# Patient Record
Sex: MALE | Race: WHITE | NOT HISPANIC OR LATINO
[De-identification: names, ages, dates, MRNs, and addresses within clinical notes are randomized per-mention and may not be internally consistent; named-entity substitution may affect disease eponyms.]

---

## 2017-03-17 ENCOUNTER — RECORDS - HEALTHEAST (OUTPATIENT)
Dept: ADMINISTRATIVE | Facility: OTHER | Age: 72
End: 2017-03-17

## 2017-03-29 ENCOUNTER — HOSPITAL ENCOUNTER (OUTPATIENT)
Dept: CARDIOLOGY | Facility: HOSPITAL | Age: 72
Discharge: HOME | End: 2017-03-29
Attending: FAMILY MEDICINE

## 2017-03-29 DIAGNOSIS — M79.89 LEG SWELLING: ICD-10-CM

## 2017-03-29 LAB
AORTIC ROOT: 3.8 CM
ASCENDING AORTA: 3.9 CM
BSA FOR ECHO PROCEDURE: 2.28 M2
CV BLOOD PRESSURE: NORMAL MMHG
CV ECHO HEIGHT: 65 IN
CV ECHO WEIGHT: 250 LBS
DOP CALC LVOT AREA: 3.14 CM2
DOP CALC LVOT DIAMETER: 2 CM
DOP CALC LVOT PEAK VEL: 101 CM/S
DOP CALC LVOT STROKE VOLUME: 67.5 CM3
DOP CALCLVOT PEAK VEL VTI: 21.5 CM
EJECTION FRACTION: 59 % (ref 55–75)
FRACTIONAL SHORTENING: 17.5 % (ref 28–44)
INTERVENTRICULAR SEPTUM IN END DIASTOLE: 1.47 CM (ref 0.6–1)
IVS/PW RATIO: 1
LA AREA 1: 17 CM2
LA AREA 2: 10.9 CM2
LEFT ATRIUM LENGTH: 4.18 CM
LEFT ATRIUM SIZE: 3.8 CM
LEFT ATRIUM VOLUME INDEX: 16.5 ML/M2
LEFT ATRIUM VOLUME: 37.7 CM3
LEFT VENTRICLE CARDIAC INDEX: 2.4 L/MIN/M2
LEFT VENTRICLE CARDIAC OUTPUT: 5.5 L/MIN
LEFT VENTRICLE DIASTOLIC VOLUME INDEX: 32.7 CM3/M2 (ref 34–74)
LEFT VENTRICLE DIASTOLIC VOLUME: 74.6 CM3 (ref 62–150)
LEFT VENTRICLE HEART RATE: 81 BPM
LEFT VENTRICLE MASS INDEX: 123.2 G/M2
LEFT VENTRICLE SYSTOLIC VOLUME INDEX: 13.4 CM3/M2 (ref 11–31)
LEFT VENTRICLE SYSTOLIC VOLUME: 30.5 CM3 (ref 21–61)
LEFT VENTRICULAR INTERNAL DIMENSION IN DIASTOLE: 4.62 CM (ref 4.2–5.8)
LEFT VENTRICULAR INTERNAL DIMENSION IN SYSTOLE: 3.81 CM (ref 2.5–4)
LEFT VENTRICULAR MASS: 280.9 G
LEFT VENTRICULAR OUTFLOW TRACT MEAN GRADIENT: 3 MMHG
LEFT VENTRICULAR OUTFLOW TRACT MEAN VELOCITY: 78.5 CM/S
LEFT VENTRICULAR OUTFLOW TRACT PEAK GRADIENT: 4 MMHG
LEFT VENTRICULAR POSTERIOR WALL IN END DIASTOLE: 1.49 CM (ref 0.6–1)
LV STROKE VOLUME INDEX: 29.6 ML/M2
MITRAL VALVE E/A RATIO: 0.5
MV AVERAGE E/E' RATIO: 9.1 CM/S
MV DECELERATION TIME: 151 MS
MV E'TISSUE VEL-LAT: 6.58 CM/S
MV E'TISSUE VEL-MED: 5.03 CM/S
MV LATERAL E/E' RATIO: 8.1
MV MEDIAL E/E' RATIO: 10.6
MV PEAK A VELOCITY: 106 CM/S
MV PEAK E VELOCITY: 53.1 CM/S
NUC REST DIASTOLIC VOLUME INDEX: 4000 LBS
NUC REST SYSTOLIC VOLUME INDEX: 65 IN
TRICUSPID VALVE ANULAR PLANE SYSTOLIC EXCURSION: 2 CM

## 2017-03-29 ASSESSMENT — MIFFLIN-ST. JEOR: SCORE: 1800.87

## 2018-02-12 ENCOUNTER — AMBULATORY - HEALTHEAST (OUTPATIENT)
Dept: VASCULAR SURGERY | Facility: CLINIC | Age: 73
End: 2018-02-12

## 2018-02-12 DIAGNOSIS — L97.522 ULCER OF LEFT FOOT, WITH FAT LAYER EXPOSED (H): ICD-10-CM

## 2018-02-26 ENCOUNTER — AMBULATORY - HEALTHEAST (OUTPATIENT)
Dept: VASCULAR SURGERY | Facility: CLINIC | Age: 73
End: 2018-02-26

## 2018-02-27 ENCOUNTER — RECORDS - HEALTHEAST (OUTPATIENT)
Dept: ADMINISTRATIVE | Facility: OTHER | Age: 73
End: 2018-02-27

## 2018-02-27 ENCOUNTER — RECORDS - HEALTHEAST (OUTPATIENT)
Dept: VASCULAR ULTRASOUND | Facility: CLINIC | Age: 73
End: 2018-02-27

## 2018-02-27 ENCOUNTER — OFFICE VISIT - HEALTHEAST (OUTPATIENT)
Dept: VASCULAR SURGERY | Facility: CLINIC | Age: 73
End: 2018-02-27

## 2018-02-27 DIAGNOSIS — E11.621 DIABETIC ULCER OF LEFT FOOT ASSOCIATED WITH TYPE 2 DIABETES MELLITUS, LIMITED TO BREAKDOWN OF SKIN, UNSPECIFIED PART OF FOOT (H): ICD-10-CM

## 2018-02-27 DIAGNOSIS — L97.521 DIABETIC ULCER OF LEFT FOOT ASSOCIATED WITH TYPE 2 DIABETES MELLITUS, LIMITED TO BREAKDOWN OF SKIN, UNSPECIFIED PART OF FOOT (H): ICD-10-CM

## 2018-02-27 DIAGNOSIS — I73.9 PERIPHERAL VASCULAR DISEASE, UNSPECIFIED (H): ICD-10-CM

## 2018-02-27 DIAGNOSIS — M25.372 INSTABILITY OF LEFT ANKLE JOINT: ICD-10-CM

## 2018-02-27 DIAGNOSIS — M25.372 OTHER INSTABILITY, LEFT ANKLE: ICD-10-CM

## 2018-02-27 DIAGNOSIS — E11.621 TYPE 2 DIABETES MELLITUS WITH FOOT ULCER (CODE) (H): ICD-10-CM

## 2018-02-27 DIAGNOSIS — I73.9 PAD (PERIPHERAL ARTERY DISEASE) (H): ICD-10-CM

## 2018-02-27 DIAGNOSIS — L97.521 NON-PRESSURE CHRONIC ULCER OF OTHER PART OF LEFT FOOT LIMITED TO BREAKDOWN OF SKIN (H): ICD-10-CM

## 2018-02-27 RX ORDER — VIT C/B6/B5/MAGNESIUM/HERB 173 50-5-6-5MG
CAPSULE ORAL
Status: SHIPPED | COMMUNITY
Start: 2018-02-27

## 2018-02-27 RX ORDER — MULTIVIT WITH IRON,MINERALS
TABLET ORAL
Status: SHIPPED | COMMUNITY
Start: 2018-01-07

## 2018-03-01 ENCOUNTER — RECORDS - HEALTHEAST (OUTPATIENT)
Dept: ADMINISTRATIVE | Facility: OTHER | Age: 73
End: 2018-03-01

## 2018-03-01 ENCOUNTER — COMMUNICATION - HEALTHEAST (OUTPATIENT)
Dept: OTHER | Facility: CLINIC | Age: 73
End: 2018-03-01

## 2018-03-01 ENCOUNTER — COMMUNICATION - HEALTHEAST (OUTPATIENT)
Dept: VASCULAR SURGERY | Facility: CLINIC | Age: 73
End: 2018-03-01

## 2018-03-01 DIAGNOSIS — E11.621 TYPE 2 DIABETES WITH SKIN ULCER OF FOOT (H): ICD-10-CM

## 2018-03-01 DIAGNOSIS — L97.509 TYPE 2 DIABETES WITH SKIN ULCER OF FOOT (H): ICD-10-CM

## 2018-03-01 DIAGNOSIS — I73.9 PAD (PERIPHERAL ARTERY DISEASE) (H): ICD-10-CM

## 2018-03-13 ENCOUNTER — HOME CARE/HOSPICE - HEALTHEAST (OUTPATIENT)
Dept: HOME HEALTH SERVICES | Facility: HOME HEALTH | Age: 73
End: 2018-03-13

## 2018-03-13 ENCOUNTER — COMMUNICATION - HEALTHEAST (OUTPATIENT)
Dept: SURGERY | Facility: CLINIC | Age: 73
End: 2018-03-13

## 2018-03-13 ENCOUNTER — OFFICE VISIT - HEALTHEAST (OUTPATIENT)
Dept: VASCULAR SURGERY | Facility: CLINIC | Age: 73
End: 2018-03-13

## 2018-03-13 DIAGNOSIS — L97.529 TYPE 2 DIABETES MELLITUS WITH LEFT DIABETIC FOOT ULCER (H): ICD-10-CM

## 2018-03-13 DIAGNOSIS — I87.2 VENOUS STASIS DERMATITIS OF BOTH LOWER EXTREMITIES: ICD-10-CM

## 2018-03-13 DIAGNOSIS — I89.0 SECONDARY LYMPHEDEMA: ICD-10-CM

## 2018-03-13 DIAGNOSIS — R60.9 DEPENDENT EDEMA: ICD-10-CM

## 2018-03-13 DIAGNOSIS — E11.621 TYPE 2 DIABETES MELLITUS WITH LEFT DIABETIC FOOT ULCER (H): ICD-10-CM

## 2018-03-13 DIAGNOSIS — Z22.39 PSEUDOMONAS AERUGINOSA COLONIZATION: ICD-10-CM

## 2018-03-13 DIAGNOSIS — E66.9 OBESITY: ICD-10-CM

## 2018-03-13 RX ORDER — ACETIC ACID 0.25 G/100ML
IRRIGANT IRRIGATION
Qty: 1,000 ML | Refills: 3 | Status: SHIPPED | OUTPATIENT
Start: 2018-03-13

## 2018-03-13 RX ORDER — GENTAMICIN SULFATE 1 MG/G
OINTMENT TOPICAL
Qty: 30 G | Refills: 3 | Status: SHIPPED | OUTPATIENT
Start: 2018-03-13

## 2018-03-16 ENCOUNTER — COMMUNICATION - HEALTHEAST (OUTPATIENT)
Dept: VASCULAR SURGERY | Facility: CLINIC | Age: 73
End: 2018-03-16

## 2018-03-16 ENCOUNTER — AMBULATORY - HEALTHEAST (OUTPATIENT)
Dept: VASCULAR SURGERY | Facility: CLINIC | Age: 73
End: 2018-03-16

## 2018-03-16 DIAGNOSIS — T14.8XXA WOUND INFECTION: ICD-10-CM

## 2018-03-16 DIAGNOSIS — L08.9 WOUND INFECTION: ICD-10-CM

## 2018-03-16 LAB
BACTERIA SPEC CULT: ABNORMAL
BACTERIA SPEC CULT: ABNORMAL
GRAM STAIN RESULT: ABNORMAL
GRAM STAIN RESULT: ABNORMAL

## 2018-03-16 RX ORDER — MUPIROCIN 20 MG/G
OINTMENT TOPICAL
Qty: 22 G | Refills: 0 | Status: SHIPPED | OUTPATIENT
Start: 2018-03-16

## 2018-03-20 ENCOUNTER — COMMUNICATION - HEALTHEAST (OUTPATIENT)
Dept: VASCULAR SURGERY | Facility: CLINIC | Age: 73
End: 2018-03-20

## 2018-04-03 ENCOUNTER — RECORDS - HEALTHEAST (OUTPATIENT)
Dept: ADMINISTRATIVE | Facility: OTHER | Age: 73
End: 2018-04-03

## 2018-04-03 ENCOUNTER — OFFICE VISIT - HEALTHEAST (OUTPATIENT)
Dept: VASCULAR SURGERY | Facility: CLINIC | Age: 73
End: 2018-04-03

## 2018-04-03 ENCOUNTER — RECORDS - HEALTHEAST (OUTPATIENT)
Dept: VASCULAR ULTRASOUND | Facility: CLINIC | Age: 73
End: 2018-04-03

## 2018-04-03 DIAGNOSIS — I87.2 VENOUS INSUFFICIENCY (CHRONIC) (PERIPHERAL): ICD-10-CM

## 2018-04-03 DIAGNOSIS — R60.9 EDEMA, UNSPECIFIED: ICD-10-CM

## 2018-04-03 DIAGNOSIS — L97.521 DIABETIC ULCER OF LEFT FOOT ASSOCIATED WITH TYPE 2 DIABETES MELLITUS, LIMITED TO BREAKDOWN OF SKIN, UNSPECIFIED PART OF FOOT (H): ICD-10-CM

## 2018-04-03 DIAGNOSIS — Z22.39 CARRIER OF OTHER SPECIFIED BACTERIAL DISEASES: ICD-10-CM

## 2018-04-03 DIAGNOSIS — Z22.39 PSEUDOMONAS AERUGINOSA COLONIZATION: ICD-10-CM

## 2018-04-03 DIAGNOSIS — I89.0 SECONDARY LYMPHEDEMA: ICD-10-CM

## 2018-04-03 DIAGNOSIS — E11.621 TYPE 2 DIABETES MELLITUS WITH LEFT DIABETIC FOOT ULCER (H): ICD-10-CM

## 2018-04-03 DIAGNOSIS — E11.621 TYPE 2 DIABETES MELLITUS WITH FOOT ULCER (CODE) (H): ICD-10-CM

## 2018-04-03 DIAGNOSIS — T14.8XXA WOUND INFECTION: ICD-10-CM

## 2018-04-03 DIAGNOSIS — L97.529 NON-PRESSURE CHRONIC ULCER OF OTHER PART OF LEFT FOOT WITH UNSPECIFIED SEVERITY (H): ICD-10-CM

## 2018-04-03 DIAGNOSIS — L97.529 TYPE 2 DIABETES MELLITUS WITH LEFT DIABETIC FOOT ULCER (H): ICD-10-CM

## 2018-04-03 DIAGNOSIS — L08.9 WOUND INFECTION: ICD-10-CM

## 2018-04-03 DIAGNOSIS — I89.0 LYMPHEDEMA, NOT ELSEWHERE CLASSIFIED: ICD-10-CM

## 2018-04-03 DIAGNOSIS — E66.9 OBESITY: ICD-10-CM

## 2018-04-03 DIAGNOSIS — E11.621 DIABETIC ULCER OF LEFT FOOT ASSOCIATED WITH TYPE 2 DIABETES MELLITUS, LIMITED TO BREAKDOWN OF SKIN, UNSPECIFIED PART OF FOOT (H): ICD-10-CM

## 2018-04-03 DIAGNOSIS — R60.9 DEPENDENT EDEMA: ICD-10-CM

## 2018-04-03 DIAGNOSIS — E66.9 OBESITY, UNSPECIFIED: ICD-10-CM

## 2018-04-03 DIAGNOSIS — I87.2 VENOUS STASIS DERMATITIS OF BOTH LOWER EXTREMITIES: ICD-10-CM

## 2018-04-25 ENCOUNTER — OFFICE VISIT - HEALTHEAST (OUTPATIENT)
Dept: VASCULAR SURGERY | Facility: CLINIC | Age: 73
End: 2018-04-25

## 2018-04-25 DIAGNOSIS — E11.621 DIABETIC ULCER OF LEFT FOOT ASSOCIATED WITH TYPE 2 DIABETES MELLITUS, LIMITED TO BREAKDOWN OF SKIN, UNSPECIFIED PART OF FOOT (H): ICD-10-CM

## 2018-04-25 DIAGNOSIS — R60.9 DEPENDENT EDEMA: ICD-10-CM

## 2018-04-25 DIAGNOSIS — L97.521 DIABETIC ULCER OF LEFT FOOT ASSOCIATED WITH TYPE 2 DIABETES MELLITUS, LIMITED TO BREAKDOWN OF SKIN, UNSPECIFIED PART OF FOOT (H): ICD-10-CM

## 2018-04-25 DIAGNOSIS — I89.0 SECONDARY LYMPHEDEMA: ICD-10-CM

## 2018-04-25 DIAGNOSIS — E11.621 TYPE 2 DIABETES MELLITUS WITH LEFT DIABETIC FOOT ULCER (H): ICD-10-CM

## 2018-04-25 DIAGNOSIS — L97.529 TYPE 2 DIABETES MELLITUS WITH LEFT DIABETIC FOOT ULCER (H): ICD-10-CM

## 2018-04-25 DIAGNOSIS — I87.2 VENOUS STASIS DERMATITIS OF BOTH LOWER EXTREMITIES: ICD-10-CM

## 2018-04-25 DIAGNOSIS — Z22.39 PSEUDOMONAS AERUGINOSA COLONIZATION: ICD-10-CM

## 2018-04-25 DIAGNOSIS — T14.8XXA WOUND INFECTION: ICD-10-CM

## 2018-04-25 DIAGNOSIS — L08.9 WOUND INFECTION: ICD-10-CM

## 2018-05-02 ENCOUNTER — OFFICE VISIT - HEALTHEAST (OUTPATIENT)
Dept: SURGERY | Facility: CLINIC | Age: 73
End: 2018-05-02

## 2018-05-02 DIAGNOSIS — E11.9 DIABETES MELLITUS TYPE II, CONTROLLED (H): ICD-10-CM

## 2018-05-02 DIAGNOSIS — E66.01 OBESITY, CLASS III, BMI 40-49.9 (MORBID OBESITY) (H): ICD-10-CM

## 2018-05-02 DIAGNOSIS — R60.0 BILATERAL LEG EDEMA: ICD-10-CM

## 2018-05-02 DIAGNOSIS — R53.83 FATIGUE: ICD-10-CM

## 2018-05-02 RX ORDER — BUPROPION HYDROCHLORIDE 75 MG/1
TABLET ORAL
Qty: 90 TABLET | Refills: 1 | Status: SHIPPED | OUTPATIENT
Start: 2018-05-02

## 2018-05-02 RX ORDER — NALTREXONE HYDROCHLORIDE 50 MG/1
TABLET, FILM COATED ORAL
Qty: 60 TABLET | Refills: 0 | Status: SHIPPED | OUTPATIENT
Start: 2018-05-02

## 2018-05-02 ASSESSMENT — MIFFLIN-ST. JEOR: SCORE: 1900.66

## 2018-05-03 ENCOUNTER — AMBULATORY - HEALTHEAST (OUTPATIENT)
Dept: SLEEP MEDICINE | Facility: CLINIC | Age: 73
End: 2018-05-03

## 2018-05-15 ENCOUNTER — OFFICE VISIT - HEALTHEAST (OUTPATIENT)
Dept: PHYSICAL THERAPY | Facility: REHABILITATION | Age: 73
End: 2018-05-15

## 2018-05-15 DIAGNOSIS — E66.01 OBESITY, CLASS III, BMI 40-49.9 (MORBID OBESITY) (H): ICD-10-CM

## 2018-05-15 DIAGNOSIS — R60.1 GENERALIZED EDEMA: ICD-10-CM

## 2018-05-15 DIAGNOSIS — E66.01 MORBID OBESITY (H): ICD-10-CM

## 2018-05-15 DIAGNOSIS — I89.0 LYMPHEDEMA: ICD-10-CM

## 2018-05-17 ENCOUNTER — OFFICE VISIT - HEALTHEAST (OUTPATIENT)
Dept: PHYSICAL THERAPY | Facility: REHABILITATION | Age: 73
End: 2018-05-17

## 2018-05-17 DIAGNOSIS — R60.1 GENERALIZED EDEMA: ICD-10-CM

## 2018-05-17 DIAGNOSIS — E66.01 OBESITY, CLASS III, BMI 40-49.9 (MORBID OBESITY) (H): ICD-10-CM

## 2018-05-17 DIAGNOSIS — I89.0 LYMPHEDEMA: ICD-10-CM

## 2018-05-17 DIAGNOSIS — E66.01 MORBID OBESITY (H): ICD-10-CM

## 2018-05-18 ENCOUNTER — OFFICE VISIT - HEALTHEAST (OUTPATIENT)
Dept: SURGERY | Facility: CLINIC | Age: 73
End: 2018-05-18

## 2018-05-18 DIAGNOSIS — Z71.3 DIETARY COUNSELING: ICD-10-CM

## 2018-05-18 DIAGNOSIS — E11.621 DIABETIC ULCER OF LEFT FOOT ASSOCIATED WITH TYPE 2 DIABETES MELLITUS, LIMITED TO BREAKDOWN OF SKIN, UNSPECIFIED PART OF FOOT (H): ICD-10-CM

## 2018-05-18 DIAGNOSIS — L97.521 DIABETIC ULCER OF LEFT FOOT ASSOCIATED WITH TYPE 2 DIABETES MELLITUS, LIMITED TO BREAKDOWN OF SKIN, UNSPECIFIED PART OF FOOT (H): ICD-10-CM

## 2018-05-18 DIAGNOSIS — E66.01 OBESITY, MORBID, BMI 40.0-49.9 (H): ICD-10-CM

## 2018-05-18 ASSESSMENT — MIFFLIN-ST. JEOR: SCORE: 1896.13

## 2018-05-21 ENCOUNTER — OFFICE VISIT - HEALTHEAST (OUTPATIENT)
Dept: PHYSICAL THERAPY | Facility: REHABILITATION | Age: 73
End: 2018-05-21

## 2018-05-21 DIAGNOSIS — E66.01 MORBID OBESITY (H): ICD-10-CM

## 2018-05-21 DIAGNOSIS — R60.1 GENERALIZED EDEMA: ICD-10-CM

## 2018-05-21 DIAGNOSIS — E66.01 OBESITY, CLASS III, BMI 40-49.9 (MORBID OBESITY) (H): ICD-10-CM

## 2018-05-21 DIAGNOSIS — L97.521 DIABETIC ULCER OF LEFT FOOT ASSOCIATED WITH TYPE 2 DIABETES MELLITUS, LIMITED TO BREAKDOWN OF SKIN, UNSPECIFIED PART OF FOOT (H): ICD-10-CM

## 2018-05-21 DIAGNOSIS — I89.0 LYMPHEDEMA: ICD-10-CM

## 2018-05-21 DIAGNOSIS — E11.621 DIABETIC ULCER OF LEFT FOOT ASSOCIATED WITH TYPE 2 DIABETES MELLITUS, LIMITED TO BREAKDOWN OF SKIN, UNSPECIFIED PART OF FOOT (H): ICD-10-CM

## 2018-05-22 ENCOUNTER — RECORDS - HEALTHEAST (OUTPATIENT)
Dept: SLEEP MEDICINE | Facility: CLINIC | Age: 73
End: 2018-05-22

## 2018-05-22 DIAGNOSIS — E66.01 MORBID (SEVERE) OBESITY DUE TO EXCESS CALORIES (H): ICD-10-CM

## 2018-05-22 DIAGNOSIS — E11.9 TYPE 2 DIABETES MELLITUS WITHOUT COMPLICATIONS (H): ICD-10-CM

## 2018-05-23 ENCOUNTER — COMMUNICATION - HEALTHEAST (OUTPATIENT)
Dept: VASCULAR SURGERY | Facility: CLINIC | Age: 73
End: 2018-05-23

## 2018-05-23 ENCOUNTER — OFFICE VISIT - HEALTHEAST (OUTPATIENT)
Dept: VASCULAR SURGERY | Facility: CLINIC | Age: 73
End: 2018-05-23

## 2018-05-23 ENCOUNTER — OFFICE VISIT - HEALTHEAST (OUTPATIENT)
Dept: PHYSICAL THERAPY | Facility: REHABILITATION | Age: 73
End: 2018-05-23

## 2018-05-23 DIAGNOSIS — L97.529 TYPE 2 DIABETES MELLITUS WITH LEFT DIABETIC FOOT ULCER (H): ICD-10-CM

## 2018-05-23 DIAGNOSIS — L08.9 WOUND INFECTION: ICD-10-CM

## 2018-05-23 DIAGNOSIS — E11.621 TYPE 2 DIABETES MELLITUS WITH LEFT DIABETIC FOOT ULCER (H): ICD-10-CM

## 2018-05-23 DIAGNOSIS — R60.1 GENERALIZED EDEMA: ICD-10-CM

## 2018-05-23 DIAGNOSIS — T14.8XXA WOUND INFECTION: ICD-10-CM

## 2018-05-23 DIAGNOSIS — I89.0 SECONDARY LYMPHEDEMA: ICD-10-CM

## 2018-05-23 DIAGNOSIS — I89.0 LYMPHEDEMA: ICD-10-CM

## 2018-05-23 DIAGNOSIS — E66.01 MORBID OBESITY (H): ICD-10-CM

## 2018-05-23 DIAGNOSIS — I87.2 VENOUS STASIS DERMATITIS OF BOTH LOWER EXTREMITIES: ICD-10-CM

## 2018-05-23 DIAGNOSIS — Z22.39 PSEUDOMONAS AERUGINOSA COLONIZATION: ICD-10-CM

## 2018-05-23 DIAGNOSIS — E66.01 OBESITY, CLASS III, BMI 40-49.9 (MORBID OBESITY) (H): ICD-10-CM

## 2018-05-23 DIAGNOSIS — R60.9 DEPENDENT EDEMA: ICD-10-CM

## 2018-05-23 RX ORDER — TRIAMCINOLONE ACETONIDE 5 MG/G
OINTMENT TOPICAL
Qty: 60 G | Refills: 0 | Status: SHIPPED | OUTPATIENT
Start: 2018-05-23

## 2018-05-24 ENCOUNTER — OFFICE VISIT - HEALTHEAST (OUTPATIENT)
Dept: PHYSICAL THERAPY | Facility: REHABILITATION | Age: 73
End: 2018-05-24

## 2018-05-24 DIAGNOSIS — R60.1 GENERALIZED EDEMA: ICD-10-CM

## 2018-05-24 DIAGNOSIS — E66.01 MORBID OBESITY (H): ICD-10-CM

## 2018-05-24 DIAGNOSIS — E66.01 OBESITY, CLASS III, BMI 40-49.9 (MORBID OBESITY) (H): ICD-10-CM

## 2018-05-29 ENCOUNTER — OFFICE VISIT - HEALTHEAST (OUTPATIENT)
Dept: PHYSICAL THERAPY | Facility: REHABILITATION | Age: 73
End: 2018-05-29

## 2018-05-29 DIAGNOSIS — E66.01 OBESITY, CLASS III, BMI 40-49.9 (MORBID OBESITY) (H): ICD-10-CM

## 2018-05-29 DIAGNOSIS — R60.1 GENERALIZED EDEMA: ICD-10-CM

## 2018-05-29 DIAGNOSIS — E66.01 MORBID OBESITY (H): ICD-10-CM

## 2018-05-29 DIAGNOSIS — I89.0 LYMPHEDEMA: ICD-10-CM

## 2018-05-29 DIAGNOSIS — M62.81 GENERALIZED MUSCLE WEAKNESS: ICD-10-CM

## 2018-06-04 ENCOUNTER — OFFICE VISIT - HEALTHEAST (OUTPATIENT)
Dept: PHYSICAL THERAPY | Facility: REHABILITATION | Age: 73
End: 2018-06-04

## 2018-06-04 DIAGNOSIS — R60.1 GENERALIZED EDEMA: ICD-10-CM

## 2018-06-04 DIAGNOSIS — E66.01 OBESITY, CLASS III, BMI 40-49.9 (MORBID OBESITY) (H): ICD-10-CM

## 2018-06-04 DIAGNOSIS — E66.01 MORBID OBESITY (H): ICD-10-CM

## 2018-06-04 DIAGNOSIS — M62.81 GENERALIZED MUSCLE WEAKNESS: ICD-10-CM

## 2018-06-08 ENCOUNTER — OFFICE VISIT - HEALTHEAST (OUTPATIENT)
Dept: PHYSICAL THERAPY | Facility: REHABILITATION | Age: 73
End: 2018-06-08

## 2018-06-08 DIAGNOSIS — M62.81 GENERALIZED MUSCLE WEAKNESS: ICD-10-CM

## 2018-06-08 DIAGNOSIS — R60.1 GENERALIZED EDEMA: ICD-10-CM

## 2018-06-08 DIAGNOSIS — E11.621 DIABETIC ULCER OF LEFT FOOT ASSOCIATED WITH TYPE 2 DIABETES MELLITUS, LIMITED TO BREAKDOWN OF SKIN, UNSPECIFIED PART OF FOOT (H): ICD-10-CM

## 2018-06-08 DIAGNOSIS — L97.521 DIABETIC ULCER OF LEFT FOOT ASSOCIATED WITH TYPE 2 DIABETES MELLITUS, LIMITED TO BREAKDOWN OF SKIN, UNSPECIFIED PART OF FOOT (H): ICD-10-CM

## 2018-06-08 DIAGNOSIS — I89.0 LYMPHEDEMA: ICD-10-CM

## 2018-06-08 DIAGNOSIS — E66.01 OBESITY, CLASS III, BMI 40-49.9 (MORBID OBESITY) (H): ICD-10-CM

## 2018-06-08 DIAGNOSIS — E66.01 MORBID OBESITY (H): ICD-10-CM

## 2018-06-11 ENCOUNTER — COMMUNICATION - HEALTHEAST (OUTPATIENT)
Dept: PHYSICAL THERAPY | Facility: REHABILITATION | Age: 73
End: 2018-06-11

## 2018-06-27 ENCOUNTER — OFFICE VISIT - HEALTHEAST (OUTPATIENT)
Dept: VASCULAR SURGERY | Facility: CLINIC | Age: 73
End: 2018-06-27

## 2018-06-27 DIAGNOSIS — R60.9 DEPENDENT EDEMA: ICD-10-CM

## 2018-06-27 DIAGNOSIS — I89.0 SECONDARY LYMPHEDEMA: ICD-10-CM

## 2018-06-27 DIAGNOSIS — T14.8XXA WOUND INFECTION: ICD-10-CM

## 2018-06-27 DIAGNOSIS — L08.9 WOUND INFECTION: ICD-10-CM

## 2018-06-27 DIAGNOSIS — I87.2 VENOUS STASIS DERMATITIS OF BOTH LOWER EXTREMITIES: ICD-10-CM

## 2018-06-27 DIAGNOSIS — Z22.39 PSEUDOMONAS AERUGINOSA COLONIZATION: ICD-10-CM

## 2018-06-27 ASSESSMENT — MIFFLIN-ST. JEOR: SCORE: 1896.13

## 2018-07-25 ENCOUNTER — OFFICE VISIT - HEALTHEAST (OUTPATIENT)
Dept: VASCULAR SURGERY | Facility: CLINIC | Age: 73
End: 2018-07-25

## 2018-07-25 DIAGNOSIS — I89.0 SECONDARY LYMPHEDEMA: ICD-10-CM

## 2018-07-25 DIAGNOSIS — L03.116 CELLULITIS OF LEFT LEG: ICD-10-CM

## 2018-07-25 DIAGNOSIS — R60.9 DEPENDENT EDEMA: ICD-10-CM

## 2018-07-25 DIAGNOSIS — E11.621 TYPE 2 DIABETES MELLITUS WITH LEFT DIABETIC FOOT ULCER (H): ICD-10-CM

## 2018-07-25 DIAGNOSIS — L97.529 TYPE 2 DIABETES MELLITUS WITH LEFT DIABETIC FOOT ULCER (H): ICD-10-CM

## 2018-07-25 DIAGNOSIS — S90.424A BLISTER OF TOE OF RIGHT FOOT WITHOUT INFECTION, INITIAL ENCOUNTER: ICD-10-CM

## 2018-07-25 DIAGNOSIS — I87.2 VENOUS STASIS DERMATITIS OF BOTH LOWER EXTREMITIES: ICD-10-CM

## 2018-08-22 ENCOUNTER — OFFICE VISIT - HEALTHEAST (OUTPATIENT)
Dept: VASCULAR SURGERY | Facility: CLINIC | Age: 73
End: 2018-08-22

## 2018-08-22 DIAGNOSIS — R60.9 DEPENDENT EDEMA: ICD-10-CM

## 2018-08-22 DIAGNOSIS — L97.529 TYPE 2 DIABETES MELLITUS WITH LEFT DIABETIC FOOT ULCER (H): ICD-10-CM

## 2018-08-22 DIAGNOSIS — I87.2 VENOUS STASIS DERMATITIS OF BOTH LOWER EXTREMITIES: ICD-10-CM

## 2018-08-22 DIAGNOSIS — I89.0 SECONDARY LYMPHEDEMA: ICD-10-CM

## 2018-08-22 DIAGNOSIS — E11.621 TYPE 2 DIABETES MELLITUS WITH LEFT DIABETIC FOOT ULCER (H): ICD-10-CM

## 2018-08-22 ASSESSMENT — MIFFLIN-ST. JEOR: SCORE: 1906.56

## 2018-12-07 ENCOUNTER — OFFICE VISIT - HEALTHEAST (OUTPATIENT)
Dept: VASCULAR SURGERY | Facility: CLINIC | Age: 73
End: 2018-12-07

## 2018-12-07 DIAGNOSIS — B35.1 ONYCHOMYCOSIS: ICD-10-CM

## 2018-12-07 DIAGNOSIS — R60.9 DEPENDENT EDEMA: ICD-10-CM

## 2018-12-07 DIAGNOSIS — I87.2 VENOUS STASIS DERMATITIS OF BOTH LOWER EXTREMITIES: ICD-10-CM

## 2018-12-07 DIAGNOSIS — E11.621 TYPE 2 DIABETES MELLITUS WITH LEFT DIABETIC FOOT ULCER (H): ICD-10-CM

## 2018-12-07 DIAGNOSIS — I89.0 SECONDARY LYMPHEDEMA: ICD-10-CM

## 2018-12-07 DIAGNOSIS — L97.529 TYPE 2 DIABETES MELLITUS WITH LEFT DIABETIC FOOT ULCER (H): ICD-10-CM

## 2021-05-29 ENCOUNTER — RECORDS - HEALTHEAST (OUTPATIENT)
Dept: ADMINISTRATIVE | Facility: CLINIC | Age: 76
End: 2021-05-29

## 2021-05-30 VITALS — WEIGHT: 250 LBS | BODY MASS INDEX: 41.65 KG/M2 | HEIGHT: 65 IN

## 2021-06-01 VITALS — BODY MASS INDEX: 45.32 KG/M2 | HEIGHT: 65 IN | WEIGHT: 272 LBS

## 2021-06-01 VITALS — WEIGHT: 276 LBS | BODY MASS INDEX: 45.93 KG/M2

## 2021-06-01 VITALS — BODY MASS INDEX: 45.15 KG/M2 | WEIGHT: 271 LBS | HEIGHT: 65 IN

## 2021-06-01 VITALS — HEIGHT: 65 IN | WEIGHT: 271 LBS | BODY MASS INDEX: 45.15 KG/M2

## 2021-06-01 VITALS — HEIGHT: 65 IN | BODY MASS INDEX: 45.54 KG/M2 | WEIGHT: 273.3 LBS

## 2021-06-02 VITALS — BODY MASS INDEX: 43.82 KG/M2 | WEIGHT: 263.3 LBS

## 2021-06-16 PROBLEM — E11.621 DIABETIC ULCER OF LEFT FOOT ASSOCIATED WITH TYPE 2 DIABETES MELLITUS, LIMITED TO BREAKDOWN OF SKIN, UNSPECIFIED PART OF FOOT (H): Status: ACTIVE | Noted: 2018-02-27

## 2021-06-16 PROBLEM — L97.521 DIABETIC ULCER OF LEFT FOOT ASSOCIATED WITH TYPE 2 DIABETES MELLITUS, LIMITED TO BREAKDOWN OF SKIN, UNSPECIFIED PART OF FOOT (H): Status: ACTIVE | Noted: 2018-02-27

## 2021-06-16 NOTE — PROGRESS NOTES
FOOT AND ANKLE SURGERY/PODIATRY Progress Note        ASSESSMENT:   Diabetic Ulceration left foot/ankle  Venous Stasis       TREATMENT:  -The left foot and ankle ulcerations are stable. No signs of infection on exam today. I discussed the principles of wound healing today including the importance of limited walking on the involved limb, good vascular perfusion, good glycemic control and the absence of infection. Referred for CAM boot.   -Base of the ulceration has fibrotic tissue. Recommend use of santyl for enzymatic debridement.   -I suspect the medial left ankle ulcer is related to venous stasis. Referred to Sarika Newsome for consultation.   -I was unable to palpate pedal pulses today. Referred for WILLIAMS's. Preliminary report indicates good perfusion to both feet.  -Sharp, excisional debridement of the wound into subcutaneous tissue was performed using currette for a total of 4.57 square centimeters. Debridement was done to reduce pressure, remove non-viable, devitalized tissue and promote wound healing. Patient tolerated this well. A gauze dressing was applied. He will apply Santyl as directed with a gauze dressing.  -He will stay limited walking in a CAM boot and will follow-up with Sarika Newsome for continued cares if she would like to continue to treat both ulcerations and venous stasis.       Kahlil Borges, [facility]      HPI: Efra Alvarez was seen today at the request of Dr. Franklin for evaluation of sores on the left foot and ankle. According to the patient the both the left foot and ankle sores started about the same time, approximately one month ago after dropping a board on his foot. He is a well controlled diabetic. Admits to smoking tobacco, 1/2 pack per day for 5+ years. Denies prior foot ulceration. He has been applying a gauze dressing with coban. Currently walking in slippers.     Past Medical History:   Diagnosis Date     Depression      Diabetes      History of DVT (deep vein  thrombosis) 2009    left leg     Hypertension      Leg swelling      Osteoarthritis      Skin ulcer of left foot        No Known Allergies      Current Outpatient Prescriptions:      aspirin 81 MG EC tablet, Take 81 mg by mouth daily., Disp: , Rfl:      cholecalciferol, vitamin D3, (VITAMIN D3) 5,000 unit Tab, Take by mouth., Disp: , Rfl:      FOLIC ACID/MULTIVITS-MIN/LUT (CENTRUM SILVER ORAL), Take by mouth., Disp: , Rfl:      KRILL/OM3/DHA/EPA/OM6/LIP/ASTX (KRILL OIL, OMEGA 3 & 6, ORAL), Take by mouth., Disp: , Rfl:      metFORMIN (FORTAMET) 500 MG (OSM) 24 hr tablet, Take 500 mg by mouth daily with breakfast., Disp: , Rfl:      multivitamin with iron-mineral Tab, , Disp: , Rfl:      naproxen (NAPROSYN) 500 MG tablet, Take 500 mg by mouth 2 (two) times a day with meals., Disp: , Rfl:      triamterene-hydrochlorothiazide (MAXZIDE) 75-50 mg per tablet, Take 1 tablet by mouth daily., Disp: , Rfl:      turmeric root extract 500 mg cap, Take by mouth., Disp: , Rfl:      calcium carbonate-vitamin D3 500mg (1,250mg) -600 unit Tab, , Disp: , Rfl:      cephalexin (KEFLEX) 500 MG capsule, , Disp: , Rfl:      co-enzyme Q-10 30 mg capsule, Take 30 mg by mouth 3 (three) times a day., Disp: , Rfl:      pgwyh-qtdkn-wcyrgij-pramoxine 3.5-500-10,000 mg-unit-unit/g Oint, , Disp: , Rfl:     Current Facility-Administered Medications:      lidocaine 2 % jelly (XYLOCAINE), , Topical, PRN, Kahlil Borges, MAYNOR, 2 application at 02/27/18 1003    Past Surgical History:   Procedure Laterality Date     TOTAL HIP ARTHROPLASTY Right 1999     TOTAL KNEE ARTHROPLASTY Left 2005       Social History     Social History Narrative       Family History   Problem Relation Age of Onset     Parkinsonism Mother      Lung cancer Father        Review of Systems - Negative       OBJECTIVE:  Appearance: alert, well appearing, and in no distress.    Vitals:    02/27/18 0951   BP: 128/76   Pulse: 84   Resp: 20   Temp: 97.8  F (36.6  C)       Vascular:  Dorsalis pedis and PT non-palpableLeft. Diffuse edema bilateral lower extremities.   Dermatologic:   Wound 02/27/18 (Active)   Pre Size Length 1.7 2/27/2018  9:00 AM   Pre Size Width 1.1 2/27/2018  9:00 AM   Pre Size Depth 0.4 2/27/2018  9:00 AM   Pre Total Sq cm 1.87 2/27/2018  9:00 AM       Wound 02/27/18 Left Medial Ankle (Active)   Pre Size Length 1.5 2/27/2018  9:00 AM   Pre Size Width 1.8 2/27/2018  9:00 AM   Pre Size Depth 0.2 2/27/2018  9:00 AM   Pre Total Sq cm 2.7 2/27/2018  9:00 AM   Granular and fibrotic base at both ulcerations, does not probe to deep tissues. No erythema noted left foot. Diffuse edema bilateral lower extremities.   Neurologic: Diminished to light touch Left.  Musculoskeletal: Contracted digits noted Left.    Imaging: None    Picture: none

## 2021-06-17 NOTE — PROGRESS NOTES
Follow up Vascular Visit       Date of Service:4/25/2018    Date Last Seen: 3/13/2018; 3/20/2018    Chief Complaint: BLE swelling; left foot and ankle ulcers    History:   Past Medical History:   Diagnosis Date     Deep vein thrombosis      Depression      Diabetes      History of DVT (deep vein thrombosis) 2009    left leg     Hypertension      Leg swelling      Osteoarthritis      Skin ulcer of left foot        Pt returns to the Vascular clinic with regards to their BLE swelling and left foot and ankle ulcers. Arrives today alone. He currently has home care. The current poc consists of 3 days per week washing the legs with acetic acid; applying gentamycin to the open areas; oil emulsion; ABD; rolled gauze; single tubigrips; we had ordered double tubigrips and short stretch but he did not tolerate. He has no compression garments at home; home care was supposed to order these. Denies fevers, chills. FS running 100-115. Did not  the am lactin lotion.     Allergies: Review of patient's allergies indicates no known allergies.    Physical Exam:    /84  Pulse 84  Temp 97.9  F (36.6  C) (Oral)   Resp 20    General:  Patient presents to clinic in no apparent distress.  Head: normocephalic atraumatic  Psychiatric:  Alert and oriented x3.   Respiratory: unlabored breathing; no cough  Integumentary:  Skin is uniformly warm, dry and pink.    Wound #1 Location: left anterior foot  Size: 0.9x0.6 x 0.1depth. This is smaller.  No sinus tract present, Wound base: initially covered with slough; this was debrided to reveal 90% viable wound bed  No undermining present. Periwound: no denudement, erythema, induration, maceration or warmth.    Wound #1 Location: left medial ankle  Size: 0.9x0.9 x 0.1depth. This is smaller; no sinus tract present, Wound base: initially covered with slough; this was debrided to reveal 90% viable wound bed  No undermining present. Periwound: no denudement, erythema, induration, maceration or  warmth.        Circumferential volume measures:    Vasc Edema 3/13/2018 4/25/2018   Right just above MTP 25 -   Right Ankle 26 -   Right Widest Calf 39 -   Right Thigh Up 10cm 52 -   Left - just above MTP 27 25.5   Left Ankle 25 25.5   Left Widest Calf 44 42.7   Left Thigh Up 10cm 54 -       Ulceration(s)/Wound(s):     Wound 02/27/18 (Active)   Pre Size Length 0.9 4/25/2018 10:00 AM   Pre Size Width 0.6 4/25/2018 10:00 AM   Pre Size Depth 0.54 4/25/2018 10:00 AM   Pre Total Sq cm 1.5 4/3/2018 10:00 AM       Wound 02/27/18 Left Medial Ankle (Active)   Pre Size Length 0.9 4/25/2018 10:00 AM   Pre Size Width 0.9 4/25/2018 10:00 AM   Pre Size Depth 0 4/3/2018 10:00 AM   Pre Total Sq cm 0.81 4/25/2018 10:00 AM        Lab Values    No results found for: SEDRATE  No results found for: CREATININE  No results found for: HGBA1C  No results found for: BUN  No results found for: LABPROT, LABALUM, ALBUMIN  No results found for: [id number]      Impression:   Left foot ulcers  Type 2 diabetes with peripheral neuropathy  Venous hypertension  Venous insufficiency  Dependent edema  Pseudomonas infection  Hx of DVT  History of right total hip  History of left total knee     Assessment/Plan:  1. Excisional debridement of all the ulcer(s) was recommended today. After consent was obtained and 2% Lidocaine HCL jelly was applied all of the ulcers were excisionally debrided using a sterile curet under clean condition the epidermal, dermal and down into the subcutaneous tissue  were sharply debrided for a total square cm of 1.35. Devitalized and non viable tissue was removed to improve granulation tissue formation, stimulate wound healing, decrease overall bacteria load, disrupt biofilm formation and decrease edge senescence. Patient tolerated this well and the ulcers appeared much  afterwards.     2. Edema. We spoke at length regarding their swelling, potential causes, and treatment options. Answered all questions. Their swelling  is likely multifactorial including but not limited to the following: their weight, dependency of the limbs for most hours of the day, reduced activity with reduced calf pump mechanism, venous hypertension, valvular incompetence, history of DVT, side effect of certain medications and history of joint replacements. Ready to transition into velcro wraps; we will or home care will order these through [facility]; home care to instruct the patient on how to don/doff; will continue with tubigrips until then. The patient should continue to elevate their legs periodically throughout the day. Continue to take their diuretics per their PMD recommendations. He completed a venous insufficiency study and this was normal; no dvt; no areas of incomptence; results were d/w the pt and answered all questions     3. Treatment continue home care every 2-3 days; continue gentamycin; acetic acid; have them wash the leg and wounds with acetic acid; apply gentamycin ointment to the open weeping areas as well as the ulcers; then medihoney alginate to the wounds; cover with oil emulsion; gauze rolled gauze     4. Offloading: eventually will benefit from diabetic shoes and insoles     5. Nutrition: we spoke about his weight an how this contributes to his swelling; he was accepting of a bariatrics referral     Patient to return to clinic in 2-3 week(s) for re-evaluation. They were instructed to call the clinic sooner with any further questions or concerns. Answered all questions.     Sarika Newsome DNP, RN, CNP, [facility]  [phone]

## 2021-06-17 NOTE — PROGRESS NOTES
[facility]:  Non-Surgical Weight Loss Intake Appointment   Date of visit: 5/2/2018  Physician: Ernie Jaime MD  Primary Care is Sallie Franklin MD.  Efra Alvarez   72 y.o.  male  Efra is a 72 y.o. year old male who is here today for consultation regarding non-surgical weight loss.   he was referred to the [facility] by BENJI Newsome at the Vascular Clinic.    Weight History:   Wt Readings from Last 3 Encounters:   05/02/18 (!) 272 lb (123.4 kg)   02/27/18 (!) 276 lb (125.2 kg)   03/29/17 (!) 250 lb (113.4 kg)     Body mass index is 45.26 kg/(m^2).       Assessment and Plan   Assessment: Efra Alvarez is a 72 y.o.,male presenting for assistance with medical weight loss.  Identified issues contributing to his excess weight include:     Patient has gained about 50 lbs since his residential 6-7 years ago from his assisted job at the [employer].  He's been dealing with some lymphedema issues and related wounds of his legs that have limited his mobility the last couple of years, further causing sedentary habits and weight gain.      His diabetes has been well controlled in the past but he's not sure of recent values. Back in 2012 levels ranged from 8.3% down to 5.8%.    His osteoarthritis further limits his ambulation and he's due to have a second knee replacement in the future, weight loss should improve outcome/mobility.    He is supplementing his statin use w/ CoQ 10 but may be a little low in the amount, discussed aiming for about 300mg/day w/ next bottle.    He had an ECHO last year that showed good EF at 59% w/ some diastolic dysfunction.      Plan:  1.  Behavior Goals: 3 daily meals plan, ideally with a large breakfast, medium lunch        and smaller dinner. Avoidance of sugared-sweetened beverages and processed        carbohydrate snacks.  Work towards pre-planning meals to avoid falling back into old             habits/obesogenic habits.  Daily Food  Tracking and morning weight recommended.  [facility]       [facility] to [facility].    2.  Diet Goals: Recommend a [address] Calorie daily restriction.  Increased protein intake to insure at        least 20-30 grams of protein per meal.      3.  Exercise/Activity Goals: will refer for PT for upper body aerobic activity/conditioning while his leg issues heal.  Long term goal of increasing endurance to allow for at least            200 minutes weekly of moderate exercise to maintain weight loss.      4.  Recommendation regarding weight loss medication:  Trial of bupropion and naltrexone started today. Will follow up in 3-4 weeks to check efficacy/tolerance.  Could be reasonable candidate for Victoza depending on his glycemic control.  Given some mild BP elevation today and age over 72, stimulant appetite suppressants will be avoided.    5.  Referral to Dietician for further education and customization of diet plan.    6.  Stress Reduction: doing well, retired. 50 year marriage.    7.  Intake Labs:  Ordered.      8.   Small airway, thick neck, morbidly obese and high risk for ENRIQUE on STOPBANG questionnaire. Will refer for split night sleep study.    9. Hx of elevated triglycerides/low HDL. On fish oil already, exercise/weight loss should further improve.    10. Lymphedema: followed by Vascular clinic, currently using compression and followed for wound care w/ NP Sarika Newsome.    1. Diabetes mellitus type II, controlled  Amb Referral to Bariatric Dietician    Split Night Sleep Study    Comprehensive Metabolic Panel    Vitamin D, Total (25-Hydroxy)    Thyroid Stimulating Hormone (TSH)    Vitamin B12    Zinc, Serum or Plasma    Glycosylated Hemoglobin A1c    Parathyroid Hormone Intact    HM2(CBC w/o Differential)    naltrexone (DEPADE) 50 mg tablet    buPROPion (WELLBUTRIN) 75 MG tablet    Ambulatory referral to Physical Therapy   2. Fatigue  Thyroid Stimulating Hormone (TSH)   3. Obesity, Class III,  BMI 40-49.9 (morbid obesity)  Amb Referral to Bariatric Dietician    Split Night Sleep Study    Comprehensive Metabolic Panel    Vitamin D, Total (25-Hydroxy)    Thyroid Stimulating Hormone (TSH)    Vitamin B12    Zinc, Serum or Plasma    Glycosylated Hemoglobin A1c    Parathyroid Hormone Intact    HM2(CBC w/o Differential)    naltrexone (DEPADE) 50 mg tablet    buPROPion (WELLBUTRIN) 75 MG tablet    Ambulatory referral to Physical Therapy   4. Bilateral leg edema  Vitamin B1 (Thiamine), Whole Blood (VIT B1 WB)    Ambulatory referral to Physical Therapy          No Follow-up on file.     Weight and Lifestyle History    Sleep history:  Goes to bed after 10pm news, falls asleep easily, gets up just once to use bathroom. No food. Wakes up around 6am on his own (retired 6-7 years). Once awake, visits with wife and has cup of coffee for awhile, then has breakfast around 8 (bowl of cereal w/ milk (Cheerios or Corn Flakes)). May run errands or will do some wood working ( tables/chairs/cabinets).  No snacks usually until lunch between 11-1 (lunch is sandwich or salad, relatively light). In the afternoon, run errands or working in the shop (6 days out of the week working on projects). Very rarely snacks. Earlier supper around 3-3:30 as gets grandson off to work (would have hamburger or brats). TV or projects, 8:30pm may have a bag of chips or small snack.  Walking is limited by wounds currently on his legs/feet. 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   Hours per night: see above   Snoring/apnea/insomnia? Some snoring.  Restful/refreshed? yes  Shift work? retired  CPAP/BiPAP? no  Sleep study previously? no  TV in bedroom? Yes, shuts off automatically around 2:30am.  Sleep aids/pills? no      STOP-BANG score for sleep apnea : 6  For general population   ENRIQUE - Low Risk : Yes to 0 - 2 questions  ENRIQUE - Intermediate Risk : Yes to 3 - 4 questions  ENRIQUE - High Risk : Yes to 5 - 8 questions  or Yes to 2 or more of 4 STOP questions  + male gender  or Yes to 2 or more of 4 STOP questions + BMI > 35kg/m2  or Yes to 2 or more of 4 STOP questions + neck circumference 17 inches / 43cm in male or 16 inches / 41cm in female    Weight History:  In what way is your excess weight affecting your wellness/health? Feels slowed   Heaviest weight: current  Any Previous weight loss, what was the most lost and method: none.  Birth weIight, High School graduation weight: 160 lbs.  Lowest adult weight: 220 lbs around USP.  Maternal health/smoking/weight: na  When did you start gaining weight and what were the circumstances? See above  Is anyone else in your immediate family overweight? Younger sister was. Had surgery.  Finally,  Is there a weight you desire to be, what is your goal weight that would define successful weight loss for you? Enough to feel better.   I would like to lose weight so I can  :  Feel healthier and live longer   .     Barriers to weight loss:  Is anyone else at home/work/family a barrier to your weight loss? no  Work schedule/location? retired  Current stressors include: no  Mobility problems: healing wounds. Feet hurt all the time.    Counseled on health benefits of weight loss, goals for metabolic/diabetic risk reduction, HTN reduction, improved sleep and mobility, cancer reduction, longevity.    Fitness History:  Were you ever an athlete?yes     Which sports? Football in HS.  When was the last time you walked/hiked a mile? 2 years ago, regularly walking 45 minutes at the [facility]  Do you have any limitations for activity?foot pain/wounds.  Do you have access to a gym, pool, club, fitness center, or ? Previous [facility]                Do you have any fitness goals/dreams that could motivate your weight loss?na    On a scale from 0-10,  how willing are you to start some sort of movement/exercise regimen? na    Counseled on physiologic benefits of exercise and for long term weight maintenance, goal working towards 200-300 minutes  weekly.     Food History:  Who buys groceries?  shared  Do you eat at dinner table, is the TV on or off, family present? At the table.  Any soda, how much? None., water or coffee will have one glass of milk daily.  Meals per day? See above  Snacks per day? See above  Breakfast typically is: see above  Lunch typically is: see above  Dinner  is: see above  Weekly Fast Food/dining out: once monthly.  Snacks consist of: see above    Food sensitivities/allergies/intolerances/avoidances: no  Water per day? 60 oz (three, 20oz bottles).    Any binging behavior?no  Any induced vomiting/purging? no  Any history of anorexia/bulimia? no  Any night eating issues? no  Stress induced eating? Yes.    Goal Setting:  Short Term Goals under 250 lbs should be good for his diabetes/overall health.  Long Term Goals  Under 200 lbs.         Patient Profile   Social History     Social History Narrative     Family History   Problem Relation Age of Onset     Parkinsonism Mother      Lung cancer Father           Past Medical History   Patient Active Problem List   Diagnosis     Diabetic ulcer of left foot associated with type 2 diabetes mellitus, limited to breakdown of skin, unspecified part of foot     Review of patient's allergies indicates no known allergies.  Current Outpatient Prescriptions   Medication Sig Note     acetic acid 0.25 % irrigation Wash wounds and surrounding skin daily with the acetic acid solution; do NOT rinse      aspirin 81 MG EC tablet Take 81 mg by mouth daily.      calcium carbonate-vitamin D3 500mg (1,250mg) -600 unit Tab  2/27/2018: Received from: External Pharmacy     cholecalciferol, vitamin D3, (VITAMIN D3) 5,000 unit Tab Take by mouth.      co-enzyme Q-10 30 mg capsule Take 30 mg by mouth 3 (three) times a day.      FOLIC ACID/MULTIVITS-MIN/LUT (CENTRUM SILVER ORAL) Take by mouth.      gentamicin (GARAMYCIN) 0.1 % ointment Apply topically to wound daily      KRILL/OM3/DHA/EPA/OM6/LIP/ASTX (KRILL OIL, OMEGA 3 &  "6, ORAL) Take by mouth.      metFORMIN (FORTAMET) 500 MG (OSM) 24 hr tablet Take 500 mg by mouth daily with breakfast. 5/2/2018: He's taking twice daily metformin, unsure if extended release or immediate.     multivitamin with iron-mineral Tab  2/27/2018: Received from: External Pharmacy     mupirocin (BACTROBAN) 2 % ointment Apply topically to wound every day      naproxen (NAPROSYN) 500 MG tablet Take 500 mg by mouth 2 (two) times a day with meals.      myctd-cewbj-urzmewt-pramoxine 3.5-500-10,000 mg-unit-unit/g Oint  2/27/2018: Received from: External Pharmacy     triamterene-hydrochlorothiazide (MAXZIDE) 75-50 mg per tablet Take 1 tablet by mouth daily.      turmeric root extract 500 mg cap Take by mouth.        Past Surgical History  He has a past surgical history that includes Total hip arthroplasty (Right, 1999) and Total knee arthroplasty (Left, 2005).     Examination   /77 (Patient Site: Right Arm, Patient Position: Sitting, Cuff Size: Adult Regular)  Pulse 88  Ht 5' 5\" (1.651 m)  Wt (!) 272 lb (123.4 kg)  SpO2 93%  BMI 45.26 kg/m2  Height: 5' 5\" (1.651 m) (5/2/2018  1:12 PM)  Initial Weight: 272 lbs (5/2/2018  1:12 PM)  Weight: 272 lb (123.4 kg) (5/2/2018  1:12 PM)  Weight loss from initial: 0 (5/2/2018  1:12 PM)  % Weight loss: 0 % (5/2/2018  1:12 PM)  BMI (Calculated): 45.3 (5/2/2018  1:12 PM)  SpO2: 93 % (5/2/2018  1:12 PM)  Heart Rate (/min): 88 (5/2/2018  1:12 PM)  BP (mmHg): 143/77 (5/2/2018  1:12 PM)  Waist Circumference (In): 54 Inches (5/2/2018  1:12 PM)  Hip Circumference (In): 54 Inches (5/2/2018  1:12 PM)  Neck Circumference (In): 20 Inches (5/2/2018  1:12 PM)  General:  Alert and ambulatory, edentulous on top and most of his lower teeth are missing as well.   HEENT:  No conjunctival pallor, moist mucous Membranes, neck is thick, no acanthosis evident.  Pulmonary:  Normal respiratory effort, no cough, no audible wheezes/crackles.  CV:  Regular rate and Rhythm, no murmurs, pulses 2 " plus.  Abdominal: obese, some mild hepatomegaly palpable. Non tender.  Extremities: compression in place in lower extremities.   Skin:  No pallor or jaundice.   Pscyh/Mood: motivated for change, open to recommendations.                                    LABS: ordered    Last recorded labs include:  No results found for: WBC, HGB, HCT, MCV, PLT   No results found for: RCFHPTMP18CI No results found for: HGBA1C   No results found for: CHOL No results found for: PTH      No results found for: FERRITIN   No results found for: HDL   No results found for: [id number] No results found for: 51970   No results found for: LDLCALC No results found for: TSH No results found for: FOLATE   No results found for: TRIG No results found for: ALT, AST, GGT, ALKPHOS, BILITOT No results found for: TESTOSTERONE     No components found for: CHOLHDL No results found for: 7597   @resusfast(vitamin a: 1)@       Other Notables/imaging:     Counselin minutes spent in direct consultation with the patient regarding conditions contributing to excess weight accumulation, with over 50% of the time spent in counseling, goal setting and initiating a plan to lose their excess weight.    Ernie Jaime MD  [facility].  2018

## 2021-06-17 NOTE — PROGRESS NOTES
Follow up Vascular Visit       Date of Service:4/3/2018    Date Last Seen: 3/13/2018; 3/20/2018    Chief Complaint: BLE swelling; left foot and ankle ulcers    History:   Past Medical History:   Diagnosis Date     Deep vein thrombosis      Depression      Diabetes      History of DVT (deep vein thrombosis) 2009    left leg     Hypertension      Leg swelling      Osteoarthritis      Skin ulcer of left foot        Pt returns to the Vascular clinic with regards to their BLE swelling and left foot and ankle ulcers. Arrives today alone. He currently has home care. The current poc consists of 3 days per week washing the legs with acetic acid; applying gentamycin to the open areas; oil emulsion; ABD; rolled gauze; single tubigrips; we had ordered double tubigrips and short stretch but he did not tolerate. He has no compression garments at home. Denies fevers, chills. FS running 100-115. Did not  the am lactin lotion.     Allergies: Review of patient's allergies indicates no known allergies.    Physical Exam:    /86  Pulse 84  Temp 99.1  F (37.3  C) (Oral)   Resp 24    General:  Patient presents to clinic in no apparent distress.  Head: normocephalic atraumatic  Psychiatric:  Alert and oriented x3.   Respiratory: unlabored breathing; no cough  Integumentary:  Skin is uniformly warm, dry and pink.    Wound #1 Location: left anterior foot  Size: 1.5L x 1W x 0.1depth. This is smaller.  No sinus tract present, Wound base: initially covered with slough; this was debrided to reveal 90% viable wound bed  No undermining present. Periwound: no denudement, erythema, induration, maceration or warmth.    Wound #1 Location: left medial ankle  Size: 1.5L x 1W x 0.1depth. This is smaller; no sinus tract present, Wound base: initially covered with slough; this was debrided to reveal 90% viable wound bed  No undermining present. Periwound: no denudement, erythema, induration, maceration or warmth.        Circumferential  volume measures:    Vasc Edema 3/13/2018   Right just above MTP 25   Right Ankle 26   Right Widest Calf 39   Right Thigh Up 10cm 52   Left - just above MTP 27   Left Ankle 25   Left Widest Calf 44   Left Thigh Up 10cm 54       Ulceration(s)/Wound(s):     Wound 02/27/18 (Active)   Pre Size Length 1.5 4/3/2018 10:00 AM   Pre Size Width 1 4/3/2018 10:00 AM   Pre Size Depth 0 4/3/2018 10:00 AM   Pre Total Sq cm 1.5 4/3/2018 10:00 AM       Wound 02/27/18 Left Medial Ankle (Active)   Pre Size Length 1.5 4/3/2018 10:00 AM   Pre Size Width 1 4/3/2018 10:00 AM   Pre Size Depth 0 4/3/2018 10:00 AM   Pre Total Sq cm 1.5 4/3/2018 10:00 AM        Lab Values    No results found for: SEDRATE  No results found for: CREATININE  No results found for: HGBA1C  No results found for: BUN  No results found for: LABPROT, LABALUM, ALBUMIN  No results found for: [id number]      Impression:   Left foot ulcers  Type 2 diabetes with peripheral neuropathy  Venous hypertension  Venous insufficiency  Dependent edema  Pseudomonas infection  Hx of DVT  History of right total hip  History of left total knee     Assessment/Plan:  1. Excisional debridement of all the ulcer(s) was recommended today. After consent was obtained and 2% Lidocaine HCL jelly was applied all of the ulcers were excisionally debrided using a sterile curet under clean condition the epidermal, dermal and down into the subcutaneous tissue  were sharply debrided for a total square cm of 3. Devitalized and non viable tissue was removed to improve granulation tissue formation, stimulate wound healing, decrease overall bacteria load, disrupt biofilm formation and decrease edge senescence. Patient tolerated this well and the ulcers appeared much  afterwards.     2. Edema. We spoke at length regarding their swelling, potential causes, and treatment options. Answered all questions. Their swelling is likely multifactorial including but not limited to the following: their weight,  dependency of the limbs for most hours of the day, reduced activity with reduced calf pump mechanism, venous hypertension, valvular incompetence, history of DVT, side effect of certain medications and history of joint replacements. Ready to transition into velcro wraps; we will or home care will order these through [facility]; home care to instruct the patient on how to don/doff; will continue with tubigrips until then. The patient should continue to elevate their legs periodically throughout the day. Continue to take their diuretics per their PMD recommendations. He completed a venous insufficiency study today and this was normal; no dvt; no areas of incomptence; results were d/w the pt and answered all questions     3. Treatment continue home care; continue gentamycin; acetic acid; have them wash the leg and wounds daily with acetic acid; apply gentamycin ointment to the open weeping areas as well as the ulcers; then medihoney alginate to the wounds; cover with oil emulsion; gauze rolled gauze; will check venous insufficiency study at next appt     4. Offloading: eventually will benefit from diabetic shoes and insoles     5. Nutrition: we spoke about his weight an how this contributes to his swelling; he was accepting of a bariatrics referral     Patient to return to clinic in 2-3 week(s) for re-evaluation. They were instructed to call the clinic sooner with any further questions or concerns. Answered all questions.     Sarika Newsome DNP, RN, CNP, CWOCN  [facility]  [phone]

## 2021-06-18 NOTE — PROGRESS NOTES
[facility] Daily Progress     Patient Name: Efra Alvarez  Date: 2018  Visit #: 4  PTA visit #:  1  Referral Diagnosis:weakness  Referring provider: Sallie Franklin MD  Visit Diagnosis:     ICD-10-CM    1. Lymphedema I89.0    2. Morbid obesity E66.01    3. Generalized edema R60.1    4. Obesity, Class III, BMI 40-49.9 (morbid obesity) E66.01          Assessment:     Patient ambulation is slower today.  O2 SAT's above 90% at room air.  Patient fatigues easily today and needs rest periods  Patient demonstrates understanding/independence with home program.  Patient is benefitting from skilled physical therapy and is making steady progress toward functional goals.  Patient is appropriate to continue with skilled physical therapy intervention, as indicated by initial plan of care.    Goal Status:   Pt. will improve posture : and demonstrate posture with minimal to no cuing;10 minutes;in walking;for walking;for exercise;in 6 weeks;Progressing toward  Pt. will improve gait speed : >.6m/s;for decreased risk of falls;for improved household ambulation;for improved community ambulation;in 4 weeks;Progressing toward      Plan / Patient Education:     Continue with initial plan of care.  Progress with home program as tolerated. continue with general strengthening/ stretching program for weight loss as tolerated.  Next visit is schedule for 60 minutes, increase exercise time as patient tolerates.  Instructed to start going for walks around his house, one block and increase gardually.    Subjective:   Pt reports compliancy with his HEP.   Pt will start to do some walking. Pt has hiking poles which he will use.   Pt reports he felt good after last session.  Pt is considering joining the [employer]. Has had a membership in the past.  Pain Ratin        Objective:     O2 SATs above 91% at room air. @ 93% after Nustep  HR: 98, HR increases to 101-105 with standing exercises, HR @ 113 after treadmill    Treatment  Today     TREATMENT MINUTES COMMENTS   Evaluation     Self-care/ Home management     Manual therapy     Neuromuscular Re-education     Therapeutic Activity     Therapeutic Exercises 55 Exercises:  Exercise #1: nu step:seat:9, arms:10, level:1 x 12 minutes  Comment #1: LE strengthening: hip AB,ext, heel/toe raises, marching added 3# to except heeltoe raises  Exercise #2: UBE: seat 13, x 8  minutes  Exercise #3: treadmillspeed 1:1 x 5 minutes, O2 stats 92% after and 95% before  Exercise #4: sit to stand  Comment #4: x10 x 2 times     Gait training     Modality__________________                Total 55    Blank areas are intentional and mean the treatment did not include these items.       Mary Anne Stock PT,CHEY-SHEILA  5/23/2018

## 2021-06-18 NOTE — PROGRESS NOTES
[facility] Daily Progress     Patient Name: Efra Alvarez  Date: 2018  Visit #: 6  PTA visit #:  1  Referral Diagnosis:weakness  Referring provider: Sallie Franklin MD  Visit Diagnosis:     ICD-10-CM    1. Morbid obesity E66.01    2. Generalized edema R60.1    3. Obesity, Class III, BMI 40-49.9 (morbid obesity) E66.01    4. Lymphedema I89.0          Assessment:     Patient is improving on his resistance to exercises,ambulation sequence improved.  O2 SAT's above 90% at room air.  Patient fatigues easily today and needs rest periods  Patient demonstrates understanding/independence with home program.  Patient is benefitting from skilled physical therapy and is making steady progress toward functional goals.  Patient is appropriate to continue with skilled physical therapy intervention, as indicated by initial plan of care.    Goal Status:   Pt. will improve posture : and demonstrate posture with minimal to no cuing;10 minutes;in walking;for walking;for exercise;in 6 weeks;Progressing toward  Pt. will improve gait speed : >.6m/s;for decreased risk of falls;for improved household ambulation;for improved community ambulation;in 4 weeks;Progressing toward      Plan / Patient Education:     Continue with initial plan of care.  Progress with home program as tolerated. continue with general strengthening/ stretching program for weight loss as tolerated.  Next visit is schedule for 60 minutes, increase exercise time as patient tolerates.  Instructed to continue with his walks around his house.    Subjective:     Pt reports he started  Going for walks around his home for 2 blocks .  Pain Ratin        Objective:     O2 SATs above 95% at room air. @ 91% after Nustep  HR: 98, HR increases to 101-105 with standing exercises, HR @ 113 after treadmill.  Patient needs rest periods.    Treatment Today     TREATMENT MINUTES COMMENTS   Evaluation     Self-care/ Home management     Manual therapy      Neuromuscular Re-education     Therapeutic Activity     Therapeutic Exercises 55 Exercises:  Exercise #1: nu step:seat: 12, arms:10, level:1 x 8 minutes- RPE after 13 somewhat hard  Comment #1: LE strengthening: hip AB,ext, heel/toe raises, marching added 3# to except heeltoe raises, O2 SATs at room air 94% before, 92% after, bilateral heel cord stretch.  Exercise #2: UBE: seat 13, x 8  minutes  Exercise #3: treadmill speed 1:1 x 7 minutes, O2 stats 91% after and 95% before  Exercise #4: sit to stand 10 in 24 sec.  Comment #4: seated: TKE, hip flexion, hip abduction with 3# resistance hold to the count of 5 x 10 repetitions     Gait training     Modality__________________                Total 55    Blank areas are intentional and mean the treatment did not include these items.       Mary Anne Stock PT,CHEY-SHEILA  5/29/2018

## 2021-06-18 NOTE — PROGRESS NOTES
"Non-surgical Weight Loss Initial Diet Evaluation     Assessment:  Pt is a 72 y.o. male being seen today for non-surgical RD nutritional evaluation. Today we reviewed current eating habits and level of physical activity, and instructed on the changes that are required for successful weight loss outcomes.    Pt Active Problem List Diagnosis:  Patient Active Problem List   Diagnosis     Diabetic ulcer of left foot associated with type 2 diabetes mellitus, limited to breakdown of skin, unspecified part of foot     Personal Goals: Weight los - pt would like to improve wounds on feet.   Personal goal weight: no weight in mind; 200lbs longest here  -over last 6 month when he hurt his foot     bupropion and naltrexone: not taking - started but stopped due to feeling \"lousy\"    Pt's Initial Weight: 272 lbs  Weight: 271 lb (122.9 kg)  Weight loss from initial: 1  % Weight loss: 0.37 %  BMI: Body mass index is 45.1 kg/(m^2).  IBW: 135 lbs    Estimated RMR (Kirk-St Jeor equation): 1921 calories  Protein requirements (.5grams to .9grams per pound IBW, 20-30% of calories, minimum of 60-80gm per day):   grams     Food allergies, intolerances, Baptism customs: none    Diabetes  Testing Blood Sugars: 105-120   Last A1C, (per pt report): around 5%  DM Meds currently taking: in chart     Vitamins/Mineral Supplementation: in chart     Biggest struggle with weight loss: pt is unsure    Who does the grocery shopping for your household? Wife  Who prepares your meals at home? Wife  Lives with wife and 2 grandsons (25,26)     Diet Recall/Time: wakes at 6/7am   Breakfast: bowl of cheerios and coffee   Am Snack: none  Lunch: sandwich OR salad   Pm snack: none  Dinner: Pro/Veg/CHO  HS Snack: chips/ice cream     Typical Snacks: above    Fats used at home: olive/canola oil    Meals per week away from home: none       Fried Foods: 0 times per week    Recommended limiting eating out to no more than 2x/week.  Patient and I reviewed the " importance of eating three consistent meals per day; as well as meal timing to be spaced 4-5 hours apart.  Snack choices: 100-150 calories (1-2x/day if physically hungry), incorporating a fruit/vegetable w/ protein source.    Portion Sizes problematic? yes per patient/diet recall  Encouraged slowing meal times down, 20-30 minutes, chewing to applesauce consistency.   To aid in proper portion control and slow meal time down discussed consuming meals off smaller plates, use toddler/children utensils and set utensils down after each bite.    Protein, vegetables/fruits, carbohydrates:   Reviewed lean protein sources today. Recommended consuming 20-25gm protein at 3 meals daily.  The patient and I discussed the importance of including lean/low fat protein at each meal and limiting carbohydrate intake to less than 25% of plate volume.     Beverages (Type/Oz. per day)  Water: 60oz  Coffee: 2cups/day  Tea: none  Milk: with cereal   Regular soda: none  Diet soda: none  Juice: none  Kahlil-Aid/lemonade/etc: none  Alcohol: none    Discussed the importance of adequate hydration and the goal of 64+ oz of fluid daily.   The patient understands the importance of avoiding all alcoholic and sweetened drinks, and instead choosing 64 oz plain water.    Exercise  Started working with PT     Pt's understands that 45-60 minutes of daily activity is an important part of weight loss success.   Encouraged pt to incorporate upper body strength training exercise, even if its lifting soup cans while watching tv at night, doing push ups/sit-ups, and abdominal work.    PES statement:    1. (NI-1.3)Excessive energy intake related to Food and nutrition related knowledge deficit concerning excessive energy/oral intake as evidenced by Intake of high caloric density foods at meals and/or snacks; large portions; Estimated intake that exceeds estimated daily energy intake and BMI 45.10    2. (NC-3.3.5) Obese, class III, BMI ?40 related to physical  inactivity as evidenced by Infrequent, low-duration and or low intensity physical activity; and Large amounts of sedentary activities; no structured physical activity regimen      Intervention  Discussion:  1. Educated pt on Eat Better, Move More, Live Well: Non-surgical Weight Loss Handout  2. Recommended to consume 20-25gm protein at 3 meals daily.  grams daily total.    Instructions/Goals:   1. Include 20-25gm protein at each meal.  2. Increase vegetable/fruit intake, by having a vegetable or fruit with each meal daily. Recommended pt to increase vegetable/fruit intake to 4-5 servings daily.  3. Increase fluid intake to 64oz daily: choose plain or calorie/alcohol-free beverages.  4. Incorporate daily structured activity, 45-60 minutes most days of the week  5. Read food labels more consistently: keeping total fat grams <10, total sugar grams <10, fiber >3gm per serving.  6. Practice plate method: 1/2 plate lean/low fat protein source, vegetable/fruit, <25% of plate complex carbohydrates.  7. Practice eating off of smaller plates/bowls, chewing to applesauce consistency, taking 20-30 minutes to eat in a calm/relaxed environment without distractions of tv/email/cell phone.    Handouts Provided:  Eat Better, Move More, Live Well: Non-surgical Weight Loss Handout  Protein Supplement Handout     Monitor/Evaluation:    Pt will f/u in one month with bariatrician, and f/u in two months with RD.    Plan for next visit with RD:  GOALS:  1) try out premier protein     Time In: 1:00p  Time Out: 1:30p    ABN signed: Yes

## 2021-06-18 NOTE — PROGRESS NOTES
[facility] Daily Progress     Patient Name: Efra Alvarez  Date: 2018  Visit #: 5  PTA visit #:  1  Referral Diagnosis:weakness  Referring provider: Ernie Jaime MD  Visit Diagnosis:     ICD-10-CM    1. Generalized edema R60.1    2. Morbid obesity E66.01    3. Obesity, Class III, BMI 40-49.9 (morbid obesity) E66.01          Assessment:     Patient ambulation is slower today.  O2 SAT's above 90% at room air.  Patient fatigues easily today and needs rest periods  Patient demonstrates understanding/independence with home program.  Patient is benefitting from skilled physical therapy and is making steady progress toward functional goals.  Patient is appropriate to continue with skilled physical therapy intervention, as indicated by initial plan of care.    Goal Status:   Pt. will improve posture : and demonstrate posture with minimal to no cuing;10 minutes;in walking;for walking;for exercise;in 6 weeks;Progressing toward  Pt. will improve gait speed : >.6m/s;for decreased risk of falls;for improved household ambulation;for improved community ambulation;in 4 weeks;Progressing toward      Plan / Patient Education:     Continue with initial plan of care.  Progress with home program as tolerated. continue with general strengthening/ stretching program for weight loss as tolerated.  Next visit is schedule for 60 minutes, increase exercise time as patient tolerates.  Instructed to start going for walks around his house, one block and increase gradually.    Subjective:   Pt reports he was very tired after the last visit he just went home and sit, she did some standing exercises.  Pain Ratin        Objective:     O2 SATs above 95% at room air. @ 91% after Nustep  HR: 98, HR increases to 101-105 with standing exercises, HR @ 113 after treadmill.  Patient needs rest periods.    Treatment Today     TREATMENT MINUTES COMMENTS   Evaluation     Self-care/ Home management     Manual therapy      Neuromuscular Re-education     Therapeutic Activity     Therapeutic Exercises 55 Exercises:  Exercise #1: nu step:seat:9, arms:10, level:1 x 7 minutes- RPE after 13 somewhat hard  Comment #1: LE strengthening: hip AB,ext, heel/toe raises, marching added 3# to except heeltoe raises, O2 SATs at room air 94% before, 92% after  Exercise #2: UBE: seat 13, x 10  minutes  Exercise #3: treadmill speed 1:1 x 5 minutes, O2 stats 91% after and 95% before  Exercise #4: sit to stand  Comment #4: x10 x 2 times, 10 stands in 25 seconds.     Gait training     Modality__________________                Total 55    Blank areas are intentional and mean the treatment did not include these items.       Mary Anne Stock PT,CHEY-SHEILA  5/24/2018

## 2021-06-18 NOTE — PROGRESS NOTES
[facility] Certification Request    May 15, 2018      Patient: Efra Alvarez  MR Number: [id number]  YOB: 1945  Date of Visit: 5/15/2018      Dear Ernie Duval MD:    Thank you for this referral.   We are seeing Efra Alvarez for Physical Therapy of lower extremities weakness.    Medicare and/or Medicaid requires physician review and approval of the treatment plan. Please review the plan of care and verify that you agree with the therapy plan of care by co-signing this note.      Plan of Care  Authorization / Certification Start Date: 05/15/18  Authorization / Certification End Date: 07/14/18  Communication with: Referral Source  Patient Related Instruction: Nature of Condition;Treatment plan and rationale;Self Care instruction;Basis of treatment;Body mechanics;Posture;Precautions;Next steps;Expected outcome  Times per Week: 2  Number of Weeks: 6  Number of Visits: 12  Discharge Planning: patient will be discharge to self care  Therapeutic Exercise: ROM;Stretching;Strengthening    Goals:  Pt. will improve posture : and demonstrate posture with minimal to no cuing;10 minutes;in walking;for walking;for exercise;in 6 weeks  Pt. will improve gait speed : >.6m/s;for decreased risk of falls;for improved household ambulation;for improved community ambulation;in 4 weeks        If you have any questions or concerns, please don't hesitate to call.    Sincerely,      Mary Anne Stock, PT, [facility]        Physician recommendation:     ___ Follow therapist's recommendation        ___ Modify therapy      *Physician co-signature indicates they certify the need for these services furnished within this plan and while under their care.        [facility]   Physical Deconditioning Initial Evaluation    Patient Name: Efra Alvarez  Date of evaluation: 5/15/2018  Referral Diagnosis: Diabetes mellitus type II, controlled  Referring provider: Ernie Jaime MD  Visit  Diagnosis:     ICD-10-CM    1. Morbid obesity E66.01    2. Lymphedema I89.0    3. Generalized edema R60.1        Assessment:        Efra Alvarez is a 72 y.o. male who presents to therapy today with chief complaints of lower extremities weakness. Onset date of sx was 3 months ago.  Pt reported h/o no falls.  Pain symptoms are weakness.  Functional impairments include walking.  Pt demo's signs and sx consistent with lower extremities weakness, patient is also obese.   Pt. is appropriate for skilled PT intervention as outlined in the Plan of Care (POC).  Pt. is a good candidate for skilled PT services to improve pain levels and function.    Patient will benefit from 1:1 skilled PT services to address the above limitations.     Goals:  Pt. will improve posture : and demonstrate posture with minimal to no cuing;10 minutes;in walking;for walking;for exercise;in 6 weeks  Pt. will improve gait speed : >.6m/s;for decreased risk of falls;for improved household ambulation;for improved community ambulation;in 4 weeks  No Data Recorded    Patient's expectations/goals are realistic.    Barriers to Learning or Achieving Goals:  Co-morbidities or other medical factors.  obesity.       Plan / Patient Instructions:        Plan of Care:   Authorization / Certification Start Date: 05/15/18  Authorization / Certification End Date: 07/14/18  Communication with: Referral Source  Patient Related Instruction: Nature of Condition;Treatment plan and rationale;Self Care instruction;Basis of treatment;Body mechanics;Posture;Precautions;Next steps;Expected outcome  Times per Week: 2  Number of Weeks: 6  Number of Visits: 12  Discharge Planning: patient will be discharge to self care  Therapeutic Exercise: ROM;Stretching;Strengthening    POC and pathology of condition were reviewed with patient.  Pt. is in agreement with the Plan of Care  A Home Exercise Program (HEP) was initiated today.  Pt. was instructed in exercises by PT and patient  "was given a handout with detailed instructions.  Patient's goal: \" to lose weight and get stronger\".  Plan for next visit: progressive lower extremities and general strengthening exercises    Treatment techniques, plan of care, and goals were discussed with the patient.  The patient agrees to the plan as outlined.  The plan of care is dynamic and will be modified on an ongoing basis.       Subjective:         Social information:   Living Situation: single family home and lives with others    Occupation: retired   Work Status: NA   Equipment Available: None    Pain Ratin  Pain rating at best: 0  Pain rating at worst: 0  Pain description: weakness    Functional limitations are described as occurring with:   ascending and descending stairs or curbs  transitional movements getting in  bed, chair and car and getting out of  bed, chair and car  walking .    Patient reports benefit from:  rest         Objective:      Note: Items left blank indicates the item was not performed or not indicated at the time of the evaluation.    Patient Outcome Measures :    Lower Extremity Functional Scale (_/80): 38/80   Scores range from 0-80, where a score of 80 represents maximum function. The minimal clinically important difference is a positive change of 9 points.    Balance Examination  1. Morbid obesity     2. Lymphedema     3. Generalized edema       Involved side: Bilateral  Posture Observation:      General sitting posture is  fair.  General standing posture is fair.  Gait Observation: slow, decreased toe off.  Assistive Device:  None    Lower Extremity Strength: UE   Date: [unfilled]     LE strength/5 Right Left Right Left Right Left   Hip Flexion  5/5 5/5       Hip Extension  4/5 4/5       Hip Abduction  4/5 4/5       Hip Adduction  4/5 4/5       Hip External Rotation         Hip Internal Rotation         Knee Extension  4/5 4/5       Knee Flexion 4/5 4/5       Ankle Dorsiflexion          Great Toe Extension         Ankle " Plantar flexion          Abdominals 3/5       Lower Extremity ROM: WFL  Date: [unfilled]      Right Left Right Left Right Left   Hip Flexion (0-120 )         Hip Abduction (0-45 )         Hip External Rotation (0-50 )         Hip Internal Rotation (0-40 )         Hip Extension (0-15 )         Knee Flexion         Knee Extension         Ankle Dorsiflexion         Ankle Plantarflexion         Ankle Inversion         Ankle Eversion           Sensation: WFL      Cardiovascular Testing: at room air  Resting:  HR: 82  SpO2: 92%    After Walk test:  HR:83  SpO2:91    After 2 minutes Rest:  HR: 82  SpO2: 92%    Two Minute Walk Test  Total meters walked: 9932.3  AD used? none  RPE: 6  Age gender norm: 140 meters  TWO- MINUTE WALK TEST (2mwt) MEANS    WOMEN MEAN DISTANCE (FEET) METERS       + cm   18-54 600.4 183 m 0.192  cm   55-59 578.7 176 m 38.776 cm   60-64 545.9 166 m 32.936 cm   65-69 509.2 155 m 20.416 cm   70-74 478.7 145 m 95.9576 cm   75-79 462.3 140 m 90.904 cm   80-85 440.6 136 m 29.488 cm     MEN   MEAN DISTANCE (FEET) METERS     + cm   18-54 659.1 200 m 89.368 cm   55-59 626.6 190 m 98.768 cm   60-64 587.6 179 m 10.048 cm   65-69 604.3 184 m 19.064 cm   70-74 565.6 172 m 39.488 cm   75-79 517.1 157 m 61.208 cm   80-85 472.7 144 m 7.896 cm     R.W Francisco Javier et al (2014)  [facility], [facility]    TU sec.            18 sec.             17 sec.               Physical Therapy  Physical Therapy Gait Speed Measurement  Gait Speed was measured by the 10-Meter Walk Test, and is an important indicator of independence and one's ability to perform ADL/IADL activities. It can be correlated to falls risk and need for further balance intervention.    Patient score: 24 sec  Gait assistive device used: no assistive device.  Time   Interpretation    >1.2 m/sec.  Independent in all activities and crossing street    0.8-1.0 m/sec  Community ambulator, household activities. May need intervention to reduce  falls risk.    0.6-0.8 m/sec  Performs self-care, limited community ambulator. May need intervention to reduce falls risk.    <0.6 m/sec  Dependent in ADL's, household ambulator. Needs intervention to reduce falls risk.    Minimally Clinically Important Difference for geriatrics: .05 meters/second, with substantial change of .13 meters/second.  Normative Data:   Healthy Adults:   (Francisco Javier, 1997; n = 230 healthy volunteers; age 20-79, measured over 7.62 m with acceleration and deceleration period)           Male   Female    Age  Comfortable  Fast  Comfortable  Fast    20's  1.39  2.53  1.41  2.47    30's  1.46  2.45  1.42  2.34    40's  1.46  2.46  1.39  2.12    50's  1.39  2.07  1.40  2.01    60's  1.36  1.93  1.30  1.77    70's  1.33  2.08  1.27  1.74           No Data Recorded    No Data Recorded        Treatment Today     TREATMENT MINUTES COMMENTS   Evaluation 30 low   Self-care/ Home management     Manual therapy     Neuromuscular Re-education     Therapeutic Activity     Therapeutic Exercises 30 Demo/performance of HEP  Patient educated on pathology  Discussed POC  Exercise #1: nu step:seat:9, arms:10, level:1 x 5 minutes  Comment #1: standing holding onto bars: marching, heel/toes, hip abduction/adduction   Gait training     Modality__________________                Total 30    Blank areas are intentional and mean the treatment did not include these items.     PT Evaluation Code: (Please list factors)  Patient History/Comorbidities: obesity.  Examination: decreased endurance and ambulation.  Clinical Presentation: stable.  Clinical Decision Making: low    Patient History/  Comorbidities Examination  (body structures and functions, activity limitations, and/or participation restrictions) Clinical Presentation Clinical Decision Making (Complexity)   No documented Comorbidities or personal factors 1-2 Elements Stable and/or uncomplicated Low   1-2 documented comorbidities or personal factor 3 Elements Evolving  clinical presentation with changing characteristics Moderate   3-4 documented comorbidities or personal factors 4 or more Unstable and unpredictable High            Mary Anne Stock, PT, 5/15/2018  2:34 PM

## 2021-06-18 NOTE — PROGRESS NOTES
"Follow up Vascular Visit       Date of Service:6/27/2018    Date Last Seen: 3/13/2018; 3/20/2018    Chief Complaint: BLE swelling; left foot and ankle ulcers    History:   Past Medical History:   Diagnosis Date     Deep vein thrombosis (H)      Depression      Diabetes (H)      History of DVT (deep vein thrombosis) 2009    left leg     Hypertension      Leg swelling      Osteoarthritis      Skin ulcer of left foot (H)        Pt returns to the Vascular clinic with regards to their BLE swelling and left foot and ankle ulcers. Arrives today alone. He currently has home care. The current poc consists of 2 days per week washing the legs with acetic acid; applying gentamycin/TCM to the open areas and rash areas; oil emulsion; ABD; rolled gauze;  And velcro wraps. Denies fevers, chills. FS running 100-115. Did  the am lactin lotion. His wife is helping as needed. He reports that his home care was concerned about infection in the left leg last week after he had been scratching at his leg; his PMD was notified and he was placed on antibiotics again; he does not know the name of this but he is tolerating well.    Allergies: Review of patient's allergies indicates no known allergies.    Physical Exam:    /80  Pulse 68  Temp 98.3  F (36.8  C)  Resp 18  Ht 5' 5\" (1.651 m)  Wt (!) 271 lb (122.9 kg)  BMI 45.1 kg/m2    General:  Patient presents to clinic in no apparent distress.  Head: normocephalic atraumatic  Psychiatric:  Alert and oriented x3.   Respiratory: unlabored breathing; no cough  Integumentary:  Skin is uniformly warm, dry and pink.    Wound #1 Location: left anterior foot  Size:  healed No undermining present. Periwound: no denudement, erythema, induration, maceration or warmth.    Wound #1 Location: left medial ankle  Size: healed. Periwound: no denudement, erythema, induration, maceration or warmth.    There is less extensive  Dry, scaling, flaking skin; this was easily debrided and removed today " revealing intact skin underneath  Still with diffuse erythema to the left leg; and areas of excoriations to the right lateral leg where he was scratching    Circumferential volume measures:    Vasc Edema 3/13/2018 4/25/2018 5/23/2018 6/27/2018   Right just above MTP 25 - - 23   Right Ankle 26 - - 23.5   Right Widest Calf 39 - - 40   Right Thigh Up 10cm 52 - - 48   Left - just above MTP 27 25.5 26 23.5   Left Ankle 25 25.5 26.4 24   Left Widest Calf 44 42.7 44 40   Left Thigh Up 10cm 54 - - 49       Ulceration(s)/Wound(s):     Wound 02/27/18 (Active)   Pre Size Length 0 6/27/2018 10:00 AM   Pre Size Width 0 6/27/2018 10:00 AM   Pre Size Depth 0 6/27/2018 10:00 AM   Pre Total Sq cm 0 6/27/2018 10:00 AM       Wound 02/27/18 Left Medial Ankle (Active)   Pre Size Length 0 6/27/2018 10:00 AM   Pre Size Width 0 6/27/2018 10:00 AM   Pre Size Depth 0 6/27/2018 10:00 AM   Pre Total Sq cm 0 6/27/2018 10:00 AM       Wound 06/27/18 Right medial shin  (Active)   Pre Size Length 1 6/27/2018 10:00 AM   Pre Size Width 0.5 6/27/2018 10:00 AM   Pre Size Depth 0 6/27/2018 10:00 AM   Pre Total Sq cm 0.5 6/27/2018 10:00 AM        Lab Values    No results found for: SEDRATE  No results found for: CREATININE  No results found for: HGBA1C  No results found for: BUN  No results found for: LABPROT, LABALUM, ALBUMIN  No results found for: [id number]      Impression:   Left foot ulcers-resolved  Type 2 diabetes with peripheral neuropathy  Venous hypertension  Venous insufficiency  Dependent edema  Pseudomonas infection  Hx of DVT  History of right total hip  History of left total knee  venous stasis dermatitis     Assessment/Plan:  1. Excisional debridement of all the ulcer(s) was recommended today. After consent was obtained and 2% Lidocaine HCL jelly was applied all of the ulcers were excisionally debrided using a sterile curet under clean condition the epidermal, dermal tissue  were sharply debrided for a total square cm of 10. Devitalized  and non viable tissue was removed to improve granulation tissue formation, stimulate wound healing, decrease overall bacteria load, disrupt biofilm formation and decrease edge senescence. Patient tolerated this well and the ulcers appeared much  afterwards.     2. Edema. We spoke at length regarding their swelling, potential causes, and treatment options. Answered all questions. Their swelling is likely multifactorial including but not limited to the following: their weight, dependency of the limbs for most hours of the day, reduced activity with reduced calf pump mechanism, venous hypertension, valvular incompetence, history of DVT, side effect of certain medications and history of joint replacements. Continue velcro wraps; he did not wear these to clinic today so we will apply double tubigrips today to get him home. The patient should continue to elevate their legs periodically throughout the day. Continue to take their diuretics per their PMD recommendations. He completed a venous insufficiency study and this was normal; no dvt; no areas of incomptence; results were d/w the pt and answered all questions     3. Treatment discontinue home care, ok to shower; continue acetic acid wash after showering; no dressings needed     4. Offloading: eventually will benefit from diabetic shoes and insoles     5. Nutrition: we spoke about his weight an how this contributes to his swelling; he was accepting of a bariatrics referral     Patient to return to clinic in 4 week(s) for re-evaluation. They were instructed to call the clinic sooner with any further questions or concerns. Answered all questions.     Sarika Newsome DNP, RN, CNP, [facility]  [phone]

## 2021-06-18 NOTE — PROGRESS NOTES
"[facility] Daily Progress     Patient Name: Efra Alvarez  Date: 2018  Visit #: 8  PTA visit #:  2  Referral Diagnosis:weakness  Referring provider:  Ernie Jaime  Visit Diagnosis:     ICD-10-CM    1. Morbid obesity E66.01    2. Generalized edema R60.1    3. Obesity, Class III, BMI 40-49.9 (morbid obesity) E66.01    4. Generalized muscle weakness M62.81    5. Lymphedema I89.0    6. Diabetic ulcer of left foot associated with type 2 diabetes mellitus, limited to breakdown of skin, unspecified part of foot E11.621     L97.521          Assessment:   Good exercise response is demonstrated by increased distance, time or resistance while SAO2 remains higher with most activities performed today    Patient demonstrates understanding/independence with home program.  Patient is benefitting from skilled physical therapy and is making steady progress toward functional goals.  Patient is appropriate to continue with skilled physical therapy intervention, as indicated by initial plan of care.    Goal Status:   Pt. will improve posture : and demonstrate posture with minimal to no cuing;10 minutes;in walking;for walking;for exercise;in 6 weeks;Progressing toward  Pt. will improve gait speed : >.6m/s;for decreased risk of falls;for improved household ambulation;for improved community ambulation;in 4 weeks;Progressing toward      Plan / Patient Education:     Continue with initial plan of care.  Progress with home program as tolerated. continue with general strengthening/ stretching program for weight loss as tolerated.  Next visit is schedule for 60 minutes, increase exercise time as patient tolerates.  Instructed to continue with his walks around his house.  Patient will schedule  2 visits the next week, then he will [facility].    Subjective:       Patient has velcro garments. They are\"hot\"    Pain Ratin    Walking most days, trying to walk longer when able, now up to 6 " "blocks    Objective:     Initial SAO2   91% at room air , HR 79 BPM   Ending SAO2 94% HR 88 BPM  Patient needs rest periods after each activity  RPE: 13 -15 with today's activity.    Treatment Today     TREATMENT MINUTES COMMENTS   Evaluation     Self-care/ Home management     Manual therapy     Neuromuscular Re-education     Therapeutic Activity     Therapeutic Exercises 55 Exercises:  Exercise #1: nu step:seat: 11, arms:10, level:1 x 5 minutes- RPE after 15. SAO2 89% ,after 1 minute SAO2 increased to94%, HR 92 BPM on  room air  Comment #1: LE strengthening: hip AB,ext, heel/toe raises, marching added 4 # to except heel/toe raises SAO2  91-94%,HR  BPM RPE 13  Exercise #2: UBE: seat 13, x 10  minutes RPE 13 somewhat hard, O2 Sats 90%, HR 86 BPM, recover after 1minute to 91%  Exercise #3: treadmill speed 1:3 mph x 7 minutes, RPE 14;  SAO2  92%  and  BPM  Exercise #4: sit to stand 10 in 24 sec.  Comment #4: seated: TKE, hip flexion, hip abduction with 3# resistance hold to the count of 5 x 10 repetitions  Exercise #5: seated orange T band: hip abd/add 5\"x 10 reps, gas pedal x 5 each leg  Comment #5: Not performed today     Gait training     Modality__________________                Total 55    Blank areas are intentional and mean the treatment did not include these items.       Janett Norman PTA,CLT  6/8/2018    "

## 2021-06-18 NOTE — PROGRESS NOTES
"[facility] Daily Progress     Patient Name: Efra Alvarez  Date: 2018  Visit #: 2  PTA visit #:  -  Referral Diagnosis:weakness  Referring provider: Ernie Jaime MD  Visit Diagnosis:     ICD-10-CM    1. Lymphedema I89.0    2. Morbid obesity E66.01    3. Generalized edema R60.1    4. Obesity, Class III, BMI 40-49.9 (morbid obesity) E66.01          Assessment:     Patient needs rest periods , O2 SATs above 90% at room air.  Patient demonstrates understanding/independence with home program.  Patient is benefitting from skilled physical therapy and is making steady progress toward functional goals.  Patient is appropriate to continue with skilled physical therapy intervention, as indicated by initial plan of care.    Goal Status:  Pt. will improve posture : and demonstrate posture with minimal to no cuing;10 minutes;in walking;for walking;for exercise;in 6 weeks;Progressing toward  Pt. will improve gait speed : >.6m/s;for decreased risk of falls;for improved household ambulation;for improved community ambulation;in 4 weeks;Progressing toward      Plan / Patient Education:     Continue with initial plan of care.  Progress with home program as tolerated. continue with general strengthening/ stretching program for weight loss as tolerated.  Next visit is schedule for 60 minutes, increase exercise time as patient tolerates.    Subjective:     Pain Ratin  \"I did the exercises\"      Objective:     O2 SATs above 90% at room air.    Treatment Today     TREATMENT MINUTES COMMENTS   Evaluation     Self-care/ Home management     Manual therapy     Neuromuscular Re-education     Therapeutic Activity     Therapeutic Exercises 45 Exercises:  Exercise #1: nu step:seat:9, arms:10, level:1 x 10 minutes  Comment #1: standing holding onto bars: marching, heel/toes, hip abduction/adduction, kicking back  x 20 repetitions RPE 13 somewaht hard  Exercise #2: UBE: seat 13, x 7  minutes  Exercise #3: treadmillspeed " 1:1 x 5 minutes     Gait training     Modality__________________                Total 45    Blank areas are intentional and mean the treatment did not include these items.       Mary Anne Stock PT  5/17/2018

## 2021-06-18 NOTE — PROGRESS NOTES
[facility] Daily Progress     Patient Name: Efra Alvarez  Date: 2018  Visit #: 3  PTA visit #:  1  Referral Diagnosis:weakness  Referring provider: Ernie Jaime MD  Visit Diagnosis:     ICD-10-CM    1. Lymphedema I89.0    2. Morbid obesity E66.01    3. Generalized edema R60.1    4. Obesity, Class III, BMI 40-49.9 (morbid obesity) E66.01          Assessment:     Patient needs rest periods.  Patient demonstrates understanding/independence with home program.  Patient is benefitting from skilled physical therapy and is making steady progress toward functional goals.  Patient is appropriate to continue with skilled physical therapy intervention, as indicated by initial plan of care.    Goal Status:   Pt. will improve posture : and demonstrate posture with minimal to no cuing;10 minutes;in walking;for walking;for exercise;in 6 weeks;Progressing toward  Pt. will improve gait speed : >.6m/s;for decreased risk of falls;for improved household ambulation;for improved community ambulation;in 4 weeks;Progressing toward      Plan / Patient Education:     Continue with initial plan of care.  Progress with home program as tolerated. continue with general strengthening/ stretching program for weight loss as tolerated.  Next visit is schedule for 60 minutes, increase exercise time as patient tolerates.    Subjective:   Pt reports compliancy with his HEP.   Pt will start to do some walking. Pt has hiking poles which he will use.   Pt reports he felt good after last session.  Pt is considering joining the [employer]. Has had a membership in the past.  Pain Ratin        Objective:     O2 SATs above 91% at room air. @ 93% after Nustep  HR: 98, HR increases to 101-105 with standing exercises, HR @ 113 after treadmill    Treatment Today     TREATMENT MINUTES COMMENTS   Evaluation     Self-care/ Home management     Manual therapy     Neuromuscular Re-education     Therapeutic Activity     Therapeutic Exercises 55  Exercises:  Exercise #1: nu step:seat:9, arms:10, level:1 x 10 minutes  Comment #1: LE strengthening: hip AB,ext, heel/toe raises, marching added 3# to except heeltoe raises  Exercise #2: UBE: seat 13, x 8  minutes  Exercise #3: treadmillspeed 1:1 x 5 minutes  Exercise #4: sit to stand  Comment #4: x10     Gait training     Modality__________________                Total 55    Blank areas are intentional and mean the treatment did not include these items.       Dot Long PTA,CLT  5/21/2018

## 2021-06-18 NOTE — PROGRESS NOTES
[facility] Daily Progress     Patient Name: Efra Alvarez  Date: 2018  Visit #: 7  PTA visit #:  1  Referral Diagnosis:weakness  Referring provider:  Ernie Jaime  Visit Diagnosis:     ICD-10-CM    1. Morbid obesity E66.01    2. Generalized edema R60.1    3. Obesity, Class III, BMI 40-49.9 (morbid obesity) E66.01    4. Generalized muscle weakness M62.81          Assessment:     Patient was able to increased time on each exercises and weight resistance, endurance is improving, O2 SATs at room air above 90%, recovers after 1 minute.  Patient is able to increased walking distance.    Patient demonstrates understanding/independence with home program.  Patient is benefitting from skilled physical therapy and is making steady progress toward functional goals.  Patient is appropriate to continue with skilled physical therapy intervention, as indicated by initial plan of care.    Goal Status:   Pt. will improve posture : and demonstrate posture with minimal to no cuing;10 minutes;in walking;for walking;for exercise;in 6 weeks;Progressing toward  Pt. will improve gait speed : >.6m/s;for decreased risk of falls;for improved household ambulation;for improved community ambulation;in 4 weeks;Progressing toward      Plan / Patient Education:     Continue with initial plan of care.  Progress with home program as tolerated. continue with general strengthening/ stretching program for weight loss as tolerated.  Next visit is schedule for 60 minutes, increase exercise time as patient tolerates.  Instructed to continue with his walks around his house.  Patient will schedule 1 more visit this week and 2 the following week, then he will continue with community exercise at the [facility].    Subjective:     Pt reports he started  Going for walks around woods for 4 blocks .  Patient has velcro garments.  Pain Ratin        Objective:     O2 SATs  97% at room air. @ 91%  during Nustep and 90% after  HR: 98, HR increases  "to 101-105 with standing exercises, HR @ 113 after treadmill.  Patient needs rest periods.  RPE : 15 hard by the end of the visit.    RPE: 13 during exercises.    Treatment Today     TREATMENT MINUTES COMMENTS   Evaluation     Self-care/ Home management     Manual therapy     Neuromuscular Re-education     Therapeutic Activity     Therapeutic Exercises 60 Exercises:  Exercise #1: nu step:seat: 12, arms:10, level:1 x 10 minutes- RPE after 13 somewhat hard, O 2 Sats 92% after at room air  Comment #1: LE strengthening: hip AB,ext, heel/toe raises, marching added 4 # to except heel/toe raises, O2 SATs at room air 94% before, 92% after, bilateral heel cord stretch.  Exercise #2: UBE: seat 13, x 9  minutes RPE 13 somewhat hard, O2 Sats 89%, recover after 1 min to 92%  Exercise #3: treadmill speed 1:1 x 5 minutes, O2 stats 89% after, recover to 90% after 1 minute, and 90% before, down to 88% during exercise  Exercise #4: sit to stand 10 in 24 sec.  Comment #4: seated: TKE, hip flexion, hip abduction with 3# resistance hold to the count of 5 x 10 repetitions  Exercise #5: seated orange T band: hip abd/add 5\"x 10 reps, gas pedal x 5 each leg     Gait training     Modality__________________                Total 60    Blank areas are intentional and mean the treatment did not include these items.       Mary Anne Stock PT,CHEY-SHEILA  6/4/2018    "

## 2021-06-19 NOTE — PROGRESS NOTES
[facility] Discharge Summary  Patient Name: Efra Alvarez  Date: 8/8/2018  Referral Diagnosis:weakness  Referring provider: Sallie Franklin MD  Visit Diagnosis:   1. Morbid obesity (H)     2. Generalized edema     3. Obesity, Class III, BMI 40-49.9 (morbid obesity) (H)     4. Generalized muscle weakness     5. Lymphedema     6. Diabetic ulcer of left foot associated with type 2 diabetes mellitus, limited to breakdown of skin, unspecified part of foot (H)         Goals:  Pt. will improve posture : and demonstrate posture with minimal to no cuing;10 minutes;in walking;for walking;for exercise;in 6 weeks;Progressing toward  Pt. will improve gait speed : >.6m/s;for decreased risk of falls;for improved household ambulation;for improved community ambulation;in 4 weeks;Progressing toward      Patient was seen for 08 visits from 5-15-18 to 6-8-18 with 0 missed appointments.  The patient met goals and has demonstrated understanding of and independence in the home program for self-care, and progression to next steps.  He will initiate contact if questions or concerns arise.  The patient was instructed to follow up with physician's clinic.    Therapy will be discontinued at this time.  The patient will need a new referral to resume.    Thank you for your referral.  Mary Anne Stock PT  8/8/2018  1:34 PM

## 2021-06-19 NOTE — PROGRESS NOTES
Follow up Vascular Visit       Date of Service:7/25/2018    Date Last Seen: 3/13/2018; 3/20/2018    Chief Complaint: BLE swelling; left foot and ankle ulcers    History:   Past Medical History:   Diagnosis Date     Deep vein thrombosis (H)      Depression      Diabetes (H)      History of DVT (deep vein thrombosis) 2009    left leg     Hypertension      Leg swelling      Osteoarthritis      Skin ulcer of left foot (H)        Pt returns to the Vascular clinic with regards to their BLE swelling and left foot and ankle ulcers. Arrives today alone. No longer has home care. The current poc consists showering; lotion; using am lactin; does not like aquaphor; Denies fevers, chills. FS running 100-115. His wife is helping as needed but she is not in good health; falling frequently. Has completed all antibiotics. Has transitioned into velcro wraps; he arrives with these on today.    Allergies: Review of patient's allergies indicates no known allergies.    Physical Exam:    /78 (Patient Position: Sitting)  Pulse 84  Temp 98.2  F (36.8  C) (Oral)   Resp 22    General:  Patient presents to clinic in no apparent distress.  Head: normocephalic atraumatic  Psychiatric:  Alert and oriented x3.   Respiratory: unlabored breathing; no cough  Integumentary:  Skin is uniformly warm, dry and pink.    Wound #1 Location: left anterior foot  Size:  healed No undermining present. Periwound: no denudement, erythema, induration, maceration or warmth.    Wound #1 Location: left medial ankle  Size: healed. Periwound: no denudement, erythema, induration, maceration or warmth.    There is less extensive  Dry, scaling, flaking skin; this was easily debrided and removed today revealing intact skin underneath  Still with diffuse erythema to the left leg; and areas of excoriations to the right lateral leg where he was scratching    Circumferential volume measures:    Vasc Edema 3/13/2018 4/25/2018 5/23/2018 6/27/2018 7/25/2018   Right just  above MTP 25 - - 23 24.7   Right Ankle 26 - - 23.5 25.7   Right Widest Calf 39 - - 40 41.7   Right Thigh Up 10cm 52 - - 48 57   Left - just above MTP 27 25.5 26 23.5 26   Left Ankle 25 25.5 26.4 24 27   Left Widest Calf 44 42.7 44 40 43.2   Left Thigh Up 10cm 54 - - 49 59.5       Ulceration(s)/Wound(s):     Wound 02/27/18 (Active)   Pre Size Length 0 6/27/2018 10:00 AM   Pre Size Width 0 6/27/2018 10:00 AM   Pre Size Depth 0 6/27/2018 10:00 AM   Pre Total Sq cm 0 6/27/2018 10:00 AM       Wound 02/27/18 Left Medial Ankle (Active)   Pre Size Length 0 6/27/2018 10:00 AM   Pre Size Width 0 6/27/2018 10:00 AM   Pre Size Depth 0 6/27/2018 10:00 AM   Pre Total Sq cm 0 6/27/2018 10:00 AM       Wound 06/27/18 Right medial shin  (Active)   Pre Size Length 1 6/27/2018 10:00 AM   Pre Size Width 0.5 6/27/2018 10:00 AM   Pre Size Depth 0 6/27/2018 10:00 AM   Pre Total Sq cm 0.5 6/27/2018 10:00 AM        Lab Values    No results found for: SEDRATE  No results found for: CREATININE  No results found for: HGBA1C  No results found for: BUN  No results found for: LABPROT, LABALUM, ALBUMIN  No results found for: [id number]      Impression:   Left foot ulcers-resolved  Type 2 diabetes with peripheral neuropathy  Venous hypertension  Venous insufficiency  Dependent edema  Pseudomonas infection  Hx of DVT  History of right total hip  History of left total knee  venous stasis dermatitis     Assessment/Plan:  1. Excisional debridement of all the ulcer(s) was recommended today. After consent was obtained and 2% Lidocaine HCL jelly was applied all of the ulcers were excisionally debrided using a sterile curet under clean condition the epidermal, dermal tissue  were sharply debrided for a total square cm of 10. Devitalized and non viable tissue was removed to improve granulation tissue formation, stimulate wound healing, decrease overall bacteria load, disrupt biofilm formation and decrease edge senescence. Patient tolerated this well and  the ulcers appeared much  afterwards.     2. Edema. We spoke at length regarding their swelling, potential causes, and treatment options. Answered all questions. Their swelling is likely multifactorial including but not limited to the following: their weight, dependency of the limbs for most hours of the day, reduced activity with reduced calf pump mechanism, venous hypertension, valvular incompetence, history of DVT, side effect of certain medications and history of joint replacements. Continue velcro wraps; he did not wear these to clinic today so we will apply double tubigrips today to get him home. The patient should continue to elevate their legs periodically throughout the day. Continue to take their diuretics per their PMD recommendations. He completed a venous insufficiency study and this was normal; no dvt; no areas of incomptence; results were d/w the pt and answered all questions     3. Treatment  ok to shower;  No dressings; will prescribe cefadroxil 500mg two times a day for ten days; still concerning for cellulitis of the left leg     4. Offloading: eventually will benefit from diabetic shoes and insoles     5. Nutrition: we spoke about his weight an how this contributes to his swelling; he was accepting of a bariatrics referral     Patient to return to clinic in 4 week(s) for re-evaluation. They were instructed to call the clinic sooner with any further questions or concerns. Answered all questions.     Sarika Newsome DNP, RN, CNP, [facility]  [facility]  [phone]

## 2021-06-20 NOTE — PROGRESS NOTES
"Follow up Vascular Visit       Date of Service:8/22/2018    Date Last Seen: 3/13/2018; 3/20/2018    Chief Complaint: BLE swelling; left foot and ankle ulcers    History:   Past Medical History:   Diagnosis Date     Deep vein thrombosis (H)      Depression      Diabetes (H)      History of DVT (deep vein thrombosis) 2009    left leg     Hypertension      Leg swelling      Osteoarthritis      Skin ulcer of left foot (H)        Pt returns to the Vascular clinic with regards to their BLE swelling and left leg cellulitis.  Arrives today alone. No longer has home care. The current poc consists showering; lotion; using am lactin; does not like aquaphor; Denies fevers, chills. FS running 100-115. His wife is helping as needed but she is not in good health; falling frequently. Has completed all antibiotics; treated last visit with Cefodroxil tolerated well. Has transitioned into velcro wraps; he arrives with these on today.    Allergies: Review of patient's allergies indicates no known allergies.    Physical Exam:    /72 (Patient Position: Semi-narayan)  Pulse 84  Temp 98  F (36.7  C) (Oral)   Resp 20  Ht 5' 5\" (1.651 m)  Wt (!) 273 lb 4.8 oz (124 kg)  BMI 45.48 kg/m2    General:  Patient presents to clinic in no apparent distress.  Head: normocephalic atraumatic  Psychiatric:  Alert and oriented x3.   Respiratory: unlabored breathing; no cough  Integumentary:  Skin is uniformly warm, dry and pink.    Wound #1 Location: left anterior foot  Size:  healed No undermining present. Periwound: no denudement, erythema, induration, maceration or warmth.    Wound #1 Location: left medial ankle  Size: healed. Periwound: no denudement, erythema, induration, maceration or warmth.    No Dry, scaling, flaking skin; intact skin   No erythema to the left leg; no areas of excoriations to the right lateral leg no evidence of scratching    Circumferential volume measures:    Vasc Edema 4/25/2018 5/23/2018 6/27/2018 7/25/2018 " 8/22/2018   Right just above MTP - - 23 24.7 24.6   Right Ankle - - 23.5 25.7 26.1   Right Widest Calf - - 40 41.7 42.5   Right Thigh Up 10cm - - 48 57 57   Left - just above MTP 25.5 26 23.5 26 25.5   Left Ankle 25.5 26.4 24 27 26.4   Left Widest Calf 42.7 44 40 43.2 44.2   Left Thigh Up 10cm - - 49 59.5 59       Ulceration(s)/Wound(s):     Wound 02/27/18 (Active)   Pre Size Length 0 6/27/2018 10:00 AM   Pre Size Width 0 6/27/2018 10:00 AM   Pre Size Depth 0 6/27/2018 10:00 AM   Pre Total Sq cm 0 6/27/2018 10:00 AM       Wound 02/27/18 Left Medial Ankle (Active)   Pre Size Length 0 6/27/2018 10:00 AM   Pre Size Width 0 6/27/2018 10:00 AM   Pre Size Depth 0 6/27/2018 10:00 AM   Pre Total Sq cm 0 6/27/2018 10:00 AM       Wound 06/27/18 Right medial shin  (Active)   Pre Size Length 1 6/27/2018 10:00 AM   Pre Size Width 0.5 6/27/2018 10:00 AM   Pre Size Depth 0 6/27/2018 10:00 AM   Pre Total Sq cm 0.5 6/27/2018 10:00 AM        Lab Values    No results found for: SEDRATE  No results found for: CREATININE  No results found for: HGBA1C  No results found for: BUN  No results found for: LABPROT, LABALUM, ALBUMIN  No results found for: [id number]      Impression:   Left foot ulcers-resolved  Type 2 diabetes with peripheral neuropathy  Venous hypertension  Venous insufficiency  Dependent edema  Pseudomonas infection  Hx of DVT  History of right total hip  History of left total knee  venous stasis dermatitis     Assessment/Plan:  1. Excisional debridement: skin intact; na     2. Edema. We spoke at length regarding their swelling, potential causes, and treatment options. Answered all questions. Their swelling is likely multifactorial including but not limited to the following: their weight, dependency of the limbs for most hours of the day, reduced activity with reduced calf pump mechanism, venous hypertension, valvular incompetence, history of DVT, side effect of certain medications and history of joint replacements.  Continue velcro wraps; he did not wear these to clinic today so we will apply double tubigrips today to get him home. The patient should continue to elevate their legs periodically throughout the day. Continue to take their diuretics per their PMD recommendations. He completed a venous insufficiency study and this was normal; no dvt; no areas of incomptence; results were d/w the pt and answered all questions     3. Treatment  ok to shower;  No dressings; lotion daily     4. Offloading: new shoes; good condition      5. Nutrition: we spoke about his weight an how this contributes to his swelling; bariatrics referral already sent     Patient to return to clinic in  6 months for re-evaluation. They were instructed to call the clinic sooner with any further questions or concerns. Answered all questions.     Sarika Newsome DNP, RN, CNP, [facility]  [phone]

## 2021-06-22 NOTE — PROGRESS NOTES
Follow up Vascular Visit       Date of Service:12/7/2018    Date Last Seen: 3/13/2018; 3/20/2018    Chief Complaint: BLE swelling; left foot and ankle ulcers    History:   Past Medical History:   Diagnosis Date     Deep vein thrombosis (H)      Depression      Diabetes (H)      History of DVT (deep vein thrombosis) 2009    left leg     Hypertension      Leg swelling      Osteoarthritis      Skin ulcer of left foot (H)        Pt returns to the Vascular clinic with regards to their nail issues and recent issue with velcro wrap chaffing the left ankle region  Arrives today alone. No longer has home care. The current poc consists showering; lotion; using am lactin; does not like aquaphor; Denies fevers, chills. FS running 100-115. His wife is helping as needed  Has transitioned into velcro wraps; he arrives with only the liner portion on today. States that last week the left ankle region became sore and chaffed; stopped wearing the velcro portion but continued with the liner. He is trying to work on his weight has lost a little; will start exercising at the [employer] soon; joined silver sneaker.     Allergies: Patient has no known allergies.    Physical Exam:    There were no vitals taken for this visit.    General:  Patient presents to clinic in no apparent distress.  Head: normocephalic atraumatic  Psychiatric:  Alert and oriented x3.   Respiratory: unlabored breathing; no cough  Integumentary:  Skin is uniformly warm, dry and pink.    No Dry, scaling, flaking skin; intact skin   No erythema to the left leg; no areas of excoriations to the right lateral leg no evidence of scratching  Swelling measures are significantly improved see below  Nails were thickened; elongated and yellowed; these were debrided and filed tolerated well no complications    Circumferential volume measures:    Vasc Edema 4/25/2018 5/23/2018 6/27/2018 7/25/2018 8/22/2018   Right just above MTP - - 23 24.7 24.6   Right Ankle - - 23.5 25.7 26.1   Right  Widest Calf - - 40 41.7 42.5   Right Thigh Up 10cm - - 48 57 57   Left - just above MTP 25.5 26 23.5 26 25.5   Left Ankle 25.5 26.4 24 27 26.4   Left Widest Calf 42.7 44 40 43.2 44.2   Left Thigh Up 10cm - - 49 59.5 59       Ulceration(s)/Wound(s):     Wound 02/27/18 (Active)   Pre Size Length 0 6/27/2018 10:00 AM   Pre Size Width 0 6/27/2018 10:00 AM   Pre Size Depth 0 6/27/2018 10:00 AM   Pre Total Sq cm 0 6/27/2018 10:00 AM       Wound 02/27/18 Left Medial Ankle (Active)   Pre Size Length 0 6/27/2018 10:00 AM   Pre Size Width 0 6/27/2018 10:00 AM   Pre Size Depth 0 6/27/2018 10:00 AM   Pre Total Sq cm 0 6/27/2018 10:00 AM       Wound 06/27/18 Right medial shin  (Active)   Pre Size Length 1 6/27/2018 10:00 AM   Pre Size Width 0.5 6/27/2018 10:00 AM   Pre Size Depth 0 6/27/2018 10:00 AM   Pre Total Sq cm 0.5 6/27/2018 10:00 AM        Lab Values    No results found for: SEDRATE  No results found for: CREATININE  No results found for: HGBA1C  No results found for: BUN  No results found for: LABPROT, LABALUM, ALBUMIN  No results found for: [id number]      Impression:   Left foot ulcers-resolved  Type 2 diabetes with peripheral neuropathy  Venous hypertension  Venous insufficiency  Dependent edema  Pseudomonas infection  Hx of DVT  History of right total hip  History of left total knee  venous stasis dermatitis  onychomycosis     Assessment/Plan:  1. Excisional debridement: skin intact; na     2. Edema. We spoke at length regarding their swelling, potential causes, and treatment options. Answered all questions. Their swelling is likely multifactorial including but not limited to the following: their weight, dependency of the limbs for most hours of the day, reduced activity with reduced calf pump mechanism, venous hypertension, valvular incompetence, history of DVT, side effect of certain medications and history of joint replacements. Continue velcro wraps; he did not wear these to clinic today so we will apply  double tubigrips today to get him home. The patient should continue to elevate their legs periodically throughout the day. Continue to take their diuretics per their PMD recommendations. He completed a venous insufficiency study previously and this was normal; no dvt; no areas of incomptence; results were previously d/w the pt and answered all questions. If the chaffing happens again he was instructed to bring his velcro wraps with him to his appt for evaluation; perhaps these were applied wrong or are too long for him     3. Treatment  ok to shower;  No dressings; lotion daily     4. Offloading: new shoes; good condition      5. Nutrition: we spoke about his weight an how this contributes to his swelling; he will be starting at the [facility] soon; joined silver sneakers    6. Onychomycosis: a total of 8 nails were debrided and filed smooth, tolerated well; no complications     Patient to return to clinic in  3 months for re-evaluation. They were instructed to call the clinic sooner with any further questions or concerns. Answered all questions.     Sarika Newsome DNP, RN, CNP, [facility]  [phone]

## 2021-06-26 NOTE — PROGRESS NOTES
Progress Notes by Sarika Newsome NP at 3/13/2018  9:20 AM     Author: Sarika Newsome NP Service: -- Author Type: Nurse Practitioner    Filed: 3/13/2018  3:10 PM Encounter Date: 3/13/2018 Status: Signed    : Sarika Newsome NP (Nurse Practitioner)       [facility] Consult Note    Date of Service:  3/13/2018    Requesting Provider: Dr. Kahlil Borges    Chief Complaint: BLE swelling; left foot ulcers    History of Present Illness: Efra Alvarez is being seen at the [facility]ascular Clinic today regarding BLE swelling and left foot ulcers. They arrive to the clinic today alone; was driven by his daughter but she stayed in the lobby. The patient reports that he first noted swelling in his legs approx 2 months ago. The wounds on the left foot region started about 1.5 months ago. He reports dropping a heavy board on the foot. Was previously using saline to wash the area; medihoney, gauze; changing daily; Dr. Borges had wanted to use santyl but there was a  $300 copay; daughter is helping change the dressing; he did not feel that he needed home care;  It is noted today that the dressings are not on correctly and he is not wearing compression. He was treated with keflex has completed this.tolerated well. Reports pain of 4/10; sharp in the wound intermittent; unsure of what brings this on or makes it better; wakes him up at night sometimes; currently using naproxen for pain. Has used tubigrips as compression in the past; he reports that these shrunk up so he stopped wearing. Denies any fevers, chills, or generalized ill feeling. Denies history of cancer. Sleeps in a bed with legs elevated. Denies history of  vein procedures he has had DVT, joint replacements; and cellulitis. Has 2 dogs in the home they leave the wound alone. Retired; previously worked in [employer]. Lives with daughter, 2 adult grandsons, and wife. Former smoker; smoked for 20 years 1.5 ppd. Takes Maxide daily as prescribed has been  on this for many years. Checks fs daily these are running 110-120. Last A1C was 5.8%; due for annual physical next week.       RESTING ANKLE-BRACHIAL INDICES  2/27/2018 10:47 AM     INDICATIONS: Nonhealing left foot ulcers. Bilateral leg pain.  COMPARISONS: None.     FINDINGS:   WILLIAMS's:  RT PTA: 0.89  RT DPA: 1.08     LT PTA: 0.88  LT DPA: 1.01     Pulse volume recordings are normal bilaterally. Digital pressures predict adequate wound healing potential, 76 mm Hg on the right and 68 mm Hg on the left.     CONCLUSIONS:   1.  Normal resting ABIs.  2.  Digital pressures predict adequate wound healing potential.    Review of Systems:   Constitutional:  negative  for fever, chills or night sweats  EENTM: positive for glasses;  positive [place]  GI:  negative for nausea/vomiting;  negative for constipation  negative diarrhea;  negative for fecal incontinence negative weight loss  :  negative dysuria, incontinence  MS: patient is ambulatory;  does not use assistive devices; can walk about 1 block  Cardiac:  negative   Respiratory:  negative SOB; denies sleep apnea; denies snoring  Endocrine:  positive diabetes  Psych:  positive depression/anxiety    Past Medical History:    Past Medical History:   Diagnosis Date   ? Deep vein thrombosis    ? Depression    ? Diabetes    ? History of DVT (deep vein thrombosis) 2009    left leg   ? Hypertension    ? Leg swelling    ? Osteoarthritis    ? Skin ulcer of left foot        Surgical History:   Past Surgical History:   Procedure Laterality Date   ? TOTAL HIP ARTHROPLASTY Right 1999   ? TOTAL KNEE ARTHROPLASTY Left 2005       Medications:    Current Outpatient Prescriptions:   ?  aspirin 81 MG EC tablet, Take 81 mg by mouth daily., Disp: , Rfl:   ?  calcium carbonate-vitamin D3 500mg (1,250mg) -600 unit Tab, , Disp: , Rfl:   ?  cephalexin (KEFLEX) 500 MG capsule, , Disp: , Rfl:   ?  cholecalciferol, vitamin D3, (VITAMIN D3) 5,000 unit Tab, Take by mouth., Disp: , Rfl:   ?  co-enzyme Q-10  30 mg capsule, Take 30 mg by mouth 3 (three) times a day., Disp: , Rfl:   ?  FOLIC ACID/MULTIVITS-MIN/LUT (CENTRUM SILVER ORAL), Take by mouth., Disp: , Rfl:   ?  KRILL/OM3/DHA/EPA/OM6/LIP/ASTX (KRILL OIL, OMEGA 3 & 6, ORAL), Take by mouth., Disp: , Rfl:   ?  metFORMIN (FORTAMET) 500 MG (OSM) 24 hr tablet, Take 500 mg by mouth daily with breakfast., Disp: , Rfl:   ?  multivitamin with iron-mineral Tab, , Disp: , Rfl:   ?  naproxen (NAPROSYN) 500 MG tablet, Take 500 mg by mouth 2 (two) times a day with meals., Disp: , Rfl:   ?  bwlmx-zqofs-odjfpgg-pramoxine 3.5-500-10,000 mg-unit-unit/g Oint, , Disp: , Rfl:   ?  triamterene-hydrochlorothiazide (MAXZIDE) 75-50 mg per tablet, Take 1 tablet by mouth daily., Disp: , Rfl:   ?  turmeric root extract 500 mg cap, Take by mouth., Disp: , Rfl:     Current Facility-Administered Medications:   ?  lidocaine 2 % jelly (XYLOCAINE), , Topical, PRN, Kahlil Borges DPM, 2 application at 02/27/18 1003    Allergies: No Known Allergies    Family history:   Family History   Problem Relation Age of Onset   ? Parkinsonism Mother    ? Lung cancer Father        Social History:   Social History     Social History   ? Marital status:      Spouse name: N/A   ? Number of children: N/A   ? Years of education: N/A     Occupational History   ? Not on file.     Social History Main Topics   ? Smoking status: Former Smoker   ? Smokeless tobacco: Never Used   ? Alcohol use Not on file   ? Drug use: Not on file   ? Sexual activity: Not on file     Other Topics Concern   ? Not on file     Social History Narrative        Physical Exam  Vitals: Blood pressure 148/66, pulse 64, temperature 97.7  F (36.5  C), temperature source Oral, resp. rate 20.  General: This is a 72 y.o. male who appears their reported age, not in acute distress; smells of tobacco smoke  Head: normocephalic, Atraumatic; wearing glasses; non-icteric sclera; no exudate; mild hearing loss   Respiratory: Clear throughout all lung  fields; unlabored breathing; no cough   Cardiac: Regular, Rate and Rhythm, no murmurs appreciated   Skin: Uniformly warm and dry  Psych: Alert and oriented x4; calm and cooperative throughout exam  Abdomen: Normal bowel sounds. Soft, symmetric, no guarding or rigidity, nontender with palpation.  No organomegaly or masses palpated. Exam limited due to body habitus  Lymphatics: No palpable nodes to bilateral groin exam limited due to large abdominal pannus  Extremities: BLE with non-pitting brawny edema; there is extensive crusting and scaling on the left leg; this is malodorous and green tinged this was all debrided and  underneath; the skin on the leg was erythematous; slightly warm; minimally tender to palpation, there is x2 ulcer on the left foot; see measures below; these were initially covered with slough; this was all debrided much  afterwards; again significant crusting and scaling on the foot; nails were ragged, elongated, eroded; a total of 4 nails were debrided and filed smooth; tolerated well; webbing is intact minimal hair growth below the knee;  strength testing revealed 4/4 to BLEs.    Sensation: Decreased to pinprick and light touch in a stocking distribution bilaterally Using a monofiliment the patient was able to identify 5/7 sites on the right plantar foot and 5/7 sites on the left plantar foot.    Peripheral Vascular: abnormal dorsalis pedis, posterior tibial pulses to left foot , using a handheld doppler these were muted; difficult to find and monophasic in nature.  Normal on the right foot; WILLIAMS indicated good blood flow. Good capillary refill. No unusual venous distention. Positive for spider veins, telangiectasias, hemosiderin deposition or hyperpigmentation and fibrosis or scarring     Circumferential volume measures:    Vasc Edema 3/13/2018   Right just above MTP 25   Right Ankle 26   Right Widest Calf 39   Right Thigh Up 10cm 52   Left - just above MTP 27   Left Ankle 25   Left  Widest Calf 44   Left Thigh Up 10cm 54       Ulceration(s)/Wound(s):     Wound 02/27/18 (Active)   Pre Size Length 1.5 3/13/2018  9:00 AM   Pre Size Width 1.1 3/13/2018  9:00 AM   Pre Size Depth 0.3 3/13/2018  9:00 AM   Pre Total Sq cm 1.65 3/13/2018  9:00 AM       Wound 02/27/18 Left Medial Ankle (Active)   Pre Size Length 1.5 3/13/2018  9:00 AM   Pre Size Width 1.8 3/13/2018  9:00 AM   Pre Size Depth 0.2 3/13/2018  9:00 AM   Pre Total Sq cm 2.7 3/13/2018  9:00 AM                 Laboratory studies:   No results found for: SEDRATE  No results found for: CREATININE  No results found for: HGBA1C  No results found for: BUN  No results found for: LABPROT, LABALUM, ALBUMIN  No results found for: [id number]          Impression:   Left foot ulcers  Type 2 diabetes with peripheral neuropathy  Venous hypertension  Venous insufficiency  Dependent edema  Pseudomonas infection  Hx of DVT  History of right total hip  History of left total knee    Assessment/Plan:  1. Excisional debridement of all the ulcer(s) was recommended today. After consent was obtained and 2% Lidocaine HCL jelly was applied all of the ulcers were excisionally debrided using a sterile curet under clean condition the epidermal, dermal and down into the subcutaneous tissue  were sharply debrided for a total square cm of 4.35. Devitalized and non viable tissue was removed to improve granulation tissue formation, stimulate wound healing, decrease overall bacteria load, disrupt biofilm formation and decrease edge senescence. Patient tolerated this well and the ulcers appeared much  afterwards.    2. Edema. We spoke at length regarding their swelling, potential causes, and treatment options. Answered all questions. Their swelling is likely multifactorial including but not limited to the following: their weight, dependency of the limbs for most hours of the day, reduced activity with reduced calf pump mechanism, venous hypertension, valvular  incompetence, history of DVT, side effect of certain medications and history of joint replacements. I would like to first reduce their swelling down using 2 layer compression wraps and then transition them into compression garments; such as compression stockings or velcro wraps. The patient should continue to elevate their legs periodically throughout the day. Continue to take their diuretics per their PMD recommendations.     3. Treatment will order home care; will have them come out daily; will order gentamycin; acetic acid; have them wash the leg and wounds daily with acetic acid; apply gentamycin ointment to the open weeping areas as well as the ulcers; then medihoney alginate to the wounds; cover with oil emulsion; gauze rolled gauze; will check venous insufficiency study at next appt    4. Offloading: eventually will benefit from diabetic shoes and insoles    5. Nutrition: we spoke about his weight an how this contributes to his swelling; he was accepting of a bariatrics referral, will have vitamin d checked at annual physical next week; PMD to replace as needed; last a1c was good    Patient to return to clinic in 2-3 week(s) for re-evaluation. They were instructed to call the clinic sooner with any further questions or concerns. Answered all questions.    Sarika Newsome DNP, RN, CNP, [facility]  [phone]

## 2021-08-22 ENCOUNTER — HEALTH MAINTENANCE LETTER (OUTPATIENT)
Age: 76
End: 2021-08-22

## 2021-10-17 ENCOUNTER — HEALTH MAINTENANCE LETTER (OUTPATIENT)
Age: 76
End: 2021-10-17

## 2022-04-03 ENCOUNTER — HEALTH MAINTENANCE LETTER (OUTPATIENT)
Age: 77
End: 2022-04-03

## 2022-10-02 ENCOUNTER — HEALTH MAINTENANCE LETTER (OUTPATIENT)
Age: 77
End: 2022-10-02

## 2023-02-11 ENCOUNTER — HEALTH MAINTENANCE LETTER (OUTPATIENT)
Age: 78
End: 2023-02-11

## 2023-08-12 ENCOUNTER — HEALTH MAINTENANCE LETTER (OUTPATIENT)
Age: 78
End: 2023-08-12

## 2023-10-21 ENCOUNTER — HEALTH MAINTENANCE LETTER (OUTPATIENT)
Age: 78
End: 2023-10-21

## 2024-03-09 ENCOUNTER — HEALTH MAINTENANCE LETTER (OUTPATIENT)
Age: 79
End: 2024-03-09